# Patient Record
Sex: MALE | URBAN - METROPOLITAN AREA
[De-identification: names, ages, dates, MRNs, and addresses within clinical notes are randomized per-mention and may not be internally consistent; named-entity substitution may affect disease eponyms.]

---

## 2024-02-20 ENCOUNTER — TELEPHONE (OUTPATIENT)
Dept: UROLOGY | Age: 29
End: 2024-02-20

## 2024-05-21 ENCOUNTER — APPOINTMENT (OUTPATIENT)
Dept: UROLOGY | Age: 29
End: 2024-05-21

## 2024-06-13 ENCOUNTER — TELEPHONE (OUTPATIENT)
Dept: DERMATOLOGY | Age: 29
End: 2024-06-13

## 2024-08-21 ENCOUNTER — APPOINTMENT (OUTPATIENT)
Dept: DERMATOLOGY | Age: 29
End: 2024-08-21

## 2024-08-21 DIAGNOSIS — D48.9 NEOPLASM OF UNCERTAIN BEHAVIOR: Primary | ICD-10-CM

## 2024-08-21 DIAGNOSIS — D23.9 BENIGN NEOPLASM OF SKIN, UNSPECIFIED LOCATION: ICD-10-CM

## 2024-08-21 DIAGNOSIS — Z12.83 SCREENING EXAM FOR SKIN CANCER: ICD-10-CM

## 2024-08-26 LAB
ASR DISCLAIMER: NORMAL
CASE RPRT: NORMAL
CLINICAL INFO: NORMAL
PATH REPORT.FINAL DX SPEC: NORMAL
PATH REPORT.GROSS SPEC: NORMAL

## 2024-09-03 ENCOUNTER — TELEPHONE (OUTPATIENT)
Dept: DERMATOLOGY | Age: 29
End: 2024-09-03

## 2024-09-18 ENCOUNTER — OFFICE VISIT (OUTPATIENT)
Dept: FAMILY MEDICINE CLINIC | Facility: CLINIC | Age: 29
End: 2024-09-18
Payer: COMMERCIAL

## 2024-09-18 ENCOUNTER — HOSPITAL ENCOUNTER (OUTPATIENT)
Dept: GENERAL RADIOLOGY | Age: 29
Discharge: HOME | End: 2024-09-18
Attending: FAMILY MEDICINE
Payer: COMMERCIAL

## 2024-09-18 VITALS
WEIGHT: 189 LBS | HEIGHT: 71 IN | BODY MASS INDEX: 26.46 KG/M2 | DIASTOLIC BLOOD PRESSURE: 88 MMHG | HEART RATE: 78 BPM | TEMPERATURE: 98 F | SYSTOLIC BLOOD PRESSURE: 130 MMHG | OXYGEN SATURATION: 99 % | RESPIRATION RATE: 16 BRPM

## 2024-09-18 DIAGNOSIS — M94.0 COSTOCHONDRITIS: ICD-10-CM

## 2024-09-18 DIAGNOSIS — Z13.220 LIPID SCREENING: ICD-10-CM

## 2024-09-18 DIAGNOSIS — R07.89 CHEST TIGHTNESS: ICD-10-CM

## 2024-09-18 DIAGNOSIS — N46.9 MALE FERTILITY PROBLEM: ICD-10-CM

## 2024-09-18 DIAGNOSIS — Z00.00 WELLNESS EXAMINATION: Primary | ICD-10-CM

## 2024-09-18 DIAGNOSIS — R93.89 ABNORMAL CXR (CHEST X-RAY): ICD-10-CM

## 2024-09-18 DIAGNOSIS — Z13.0 SCREENING FOR DEFICIENCY ANEMIA: ICD-10-CM

## 2024-09-18 PROCEDURE — 71046 X-RAY EXAM CHEST 2 VIEWS: CPT | Performed by: FAMILY MEDICINE

## 2024-09-18 PROCEDURE — 99385 PREV VISIT NEW AGE 18-39: CPT | Performed by: FAMILY MEDICINE

## 2024-09-18 PROCEDURE — 99213 OFFICE O/P EST LOW 20 MIN: CPT | Performed by: FAMILY MEDICINE

## 2024-09-18 RX ORDER — IBUPROFEN 800 MG/1
800 TABLET, FILM COATED ORAL 3 TIMES DAILY
Qty: 270 TABLET | Refills: 3 | Status: SHIPPED | OUTPATIENT
Start: 2024-09-18 | End: 2025-09-13

## 2024-09-18 NOTE — PROGRESS NOTES
Marcos Albert is a 28 year old male.   Chief Complaint   Patient presents with    Chest Pain    Fatigue    Physical     Talk about having kids. Trying for 2 years.      HPI:1.  Cpx: new patient here in clinice with multiple issues:    Pt has hx of chest tighness that occurs out of nowhere and can last for days. Pt had it evaluated [place] in  and no cardica findings, pt states that it just started again yesterday now not very severe and can make him difficult to take a deep breath.     Pt states that is trying to become a father and has not occurred in 2 years, request sperm testing    History reviewed. No pertinent past medical history.    Past Surgical History:   Procedure Laterality Date    Tonsillectomy      [place] teeth removed Bilateral        History reviewed. No pertinent family history.    Social History:    Social History     Socioeconomic History    Marital status:    Tobacco Use    Smoking status: Never    Smokeless tobacco: Never   Vaping Use    Vaping status: Never Used   Substance and Sexual Activity    Alcohol use: Yes     Comment: minimal     Social Determinants of Health      Received from [facility]    Housing Stability       ALLERGIES:  Not on File   CURRENT MEDS:  Current Outpatient Medications   Medication Sig Dispense Refill    ibuprofen 800 MG Oral Tab Take 1 tablet (800 mg total) by mouth 3 (three) times daily. 270 tablet 3        REVIEW OF SYSTEMS:   GENERAL HEALTH: Feels well overall  SKIN: Denies any unusual skin lesions or rashes  EYES: No visual complaints or deficits  HEENT: Denies nasal congestion, sinus pain or sore throat; hearing loss negative  RESPIRATORY: Denies shortness of breath, wheezing or cough   CARDIOVASCULAR: Denies chest pain or FOLEY; no palpitations   GI: Denies nausea, vomiting, constipation, diarrhea; no rectal bleeding; no heartburn  GENITAL/: No urinary symptoms  MUSCULOSKELETAL: No joint complaints upper or lower  extremities  NEURO: No sensory or motor complaint  PSYCHE: No symptoms of depression or anxiety  HEMATOLOGY: No easy bleeding, bruising,   ENDOCRINE: No thyroid symptoms      PHYSICAL EXAM:   GENERAL: Well developed, well nourished, in no apparent distress, A&O X 3  SKIN: No rashes, no suspicious lesions  EYES: PERRL, EOMI, sclera anicteric, conjunctiva normal  HEENT: Normocephalic; normal nose, pharynx and TM's  NECK: supple; Full range of motion , no JVD, thyroid not enlarged, no carotid bruits  RESPIRATORY: Bilaterally  clear to auscultation, no rales, no wheezing, good A/E bilaterally, reproducble chest pain to pressure at LUSB  CARDIOVASCULAR: S1, S2 normal, RRR; no S3, no S4; no click; no murmur  ABDOMEN:  Soft, nondistended; no HSM; no masses; nontender, no guarding  GENITAL/: deferred  RECTAL:defered  LYMPHATIC: No lymphadenopathy  MUSCULOSKELETAL: Full painless ROM of U/E and L/E joints,no joint effusion  EXTREMITIES: No cyanosis, clubbing or edema, peripheral pulses intact  NEUROLOGIC:Motor power 5/5 all over, Cranial nerves 2-12: unremarcable; no sensory deficits  PSYCHIATRIC: Alert and oriented x 3; affect appropriate    ASSESSMENT/PLAN:     Diagnoses and all orders for this visit:    Wellness examination  -     Comp Metabolic Panel (14); Future  -     Lipid Panel; Future  -     CBC With Differential With Platelet; Future    Male fertility problem  -     TSH and Free T4; Future  -     Semen Analysis, Postvas [E]; Future  -     OFFICE/OUTPT VISIT,EST,LEVL III    Lipid screening  -     Lipid Panel; Future    Screening for deficiency anemia  -     CBC With Differential With Platelet; Future    Chest tightness  -     XR CHEST PA + LAT CHEST (CPT=71046); Future  -     OFFICE/OUTPT VISIT,EST,LEVL III    Costochondritis    Other orders  -     ibuprofen 800 MG Oral Tab; Take 1 tablet (800 mg total) by mouth 3 (three) times daily.    Discussed preventative care, fasting labs    Will need to see fertility but  will order spem testing to set him up with Leobardo Rodarte MD    Suspect costcondritis as cause given the reproducibility and how he describes it, needs nsaids and will check CXR to make sure there is no other anatomical issue. Not cardiac in nature, may need to deal with episodes as they occur         The patient verbalizes understanding of diagnosis, treatment plan and agrees to the plan of care.

## 2024-09-21 ENCOUNTER — HOSPITAL ENCOUNTER (OUTPATIENT)
Dept: CT IMAGING | Age: 29
Discharge: HOME | End: 2024-09-21
Attending: FAMILY MEDICINE
Payer: COMMERCIAL

## 2024-09-21 ENCOUNTER — IMAGING SERVICES (OUTPATIENT)
Dept: OTHER | Age: 29
End: 2024-09-21

## 2024-09-21 DIAGNOSIS — R93.89 ABNORMAL CXR (CHEST X-RAY): ICD-10-CM

## 2024-09-21 PROCEDURE — 71250 CT THORAX DX C-: CPT | Performed by: FAMILY MEDICINE

## 2024-09-23 ENCOUNTER — TELEPHONE (OUTPATIENT)
Dept: FAMILY MEDICINE CLINIC | Facility: CLINIC | Age: 29
End: 2024-09-23

## 2024-09-23 NOTE — TELEPHONE ENCOUNTER
Left message on voicemail/answering machine for patient to call office    Referred to Provider Information:  Provider Address Phone   Hubert Anderson MD [address] [facility] [id number] [phone]

## 2024-09-23 NOTE — TELEPHONE ENCOUNTER
----- Message from Brady Guillermo sent at 9/23/2024  8:03 AM CDT -----  Results reviewed. Please inform the patient there is a sizable cyst on you chest that will need to be evaluated, I will need to refer you to a cardiothoracic surgeon to help up in this process and any intervention would now be out of my hands, the referral will be placed on mychart.

## 2024-09-24 ENCOUNTER — TELEPHONE (OUTPATIENT)
Dept: FAMILY MEDICINE CLINIC | Facility: CLINIC | Age: 29
End: 2024-09-24

## 2024-09-24 ENCOUNTER — LABORATORY ENCOUNTER (OUTPATIENT)
Dept: LAB | Age: 29
End: 2024-09-24
Attending: FAMILY MEDICINE
Payer: COMMERCIAL

## 2024-09-24 DIAGNOSIS — Z13.0 SCREENING FOR DEFICIENCY ANEMIA: ICD-10-CM

## 2024-09-24 DIAGNOSIS — Z00.00 WELLNESS EXAMINATION: ICD-10-CM

## 2024-09-24 DIAGNOSIS — N46.9 MALE FERTILITY PROBLEM: ICD-10-CM

## 2024-09-24 DIAGNOSIS — Z13.220 LIPID SCREENING: ICD-10-CM

## 2024-09-24 LAB
ALBUMIN SERPL-MCNC: 4.4 G/DL (ref 3.2–4.8)
ALBUMIN/GLOB SERPL: 1.8 {RATIO} (ref 1–2)
ALP LIVER SERPL-CCNC: 96 U/L
ALT SERPL-CCNC: 20 U/L
ANION GAP SERPL CALC-SCNC: 6 MMOL/L (ref 0–18)
AST SERPL-CCNC: 18 U/L (ref ?–34)
BASOPHILS # BLD AUTO: 0.05 X10(3) UL (ref 0–0.2)
BASOPHILS NFR BLD AUTO: 0.6 %
BILIRUB SERPL-MCNC: 0.8 MG/DL (ref 0.3–1.2)
BUN BLD-MCNC: 11 MG/DL (ref 9–23)
CALCIUM BLD-MCNC: 9.9 MG/DL (ref 8.7–10.4)
CHLORIDE SERPL-SCNC: 104 MMOL/L (ref 98–112)
CHOLEST SERPL-MCNC: 222 MG/DL (ref ?–200)
CO2 SERPL-SCNC: 28 MMOL/L (ref 21–32)
CREAT BLD-MCNC: 1.22 MG/DL
EGFRCR SERPLBLD CKD-EPI 2021: 82 ML/MIN/1.73M2 (ref 60–?)
EOSINOPHIL # BLD AUTO: 0.15 X10(3) UL (ref 0–0.7)
EOSINOPHIL NFR BLD AUTO: 1.8 %
ERYTHROCYTE [DISTWIDTH] IN BLOOD BY AUTOMATED COUNT: 12.1 %
FASTING PATIENT LIPID ANSWER: YES
FASTING STATUS PATIENT QL REPORTED: YES
GLOBULIN PLAS-MCNC: 2.5 G/DL (ref 2–3.5)
GLUCOSE BLD-MCNC: 102 MG/DL (ref 70–99)
HCT VFR BLD AUTO: 44.9 %
HDLC SERPL-MCNC: 43 MG/DL (ref 40–59)
HGB BLD-MCNC: 15 G/DL
IMM GRANULOCYTES # BLD AUTO: 0.05 X10(3) UL (ref 0–1)
IMM GRANULOCYTES NFR BLD: 0.6 %
LDLC SERPL CALC-MCNC: 148 MG/DL (ref ?–100)
LYMPHOCYTES # BLD AUTO: 3.09 X10(3) UL (ref 1–4)
LYMPHOCYTES NFR BLD AUTO: 37.3 %
MCH RBC QN AUTO: 29.7 PG (ref 26–34)
MCHC RBC AUTO-ENTMCNC: 33.4 G/DL (ref 31–37)
MCV RBC AUTO: 88.9 FL
MONOCYTES # BLD AUTO: 0.82 X10(3) UL (ref 0.1–1)
MONOCYTES NFR BLD AUTO: 9.9 %
NEUTROPHILS # BLD AUTO: 4.12 X10 (3) UL (ref 1.5–7.7)
NEUTROPHILS # BLD AUTO: 4.12 X10(3) UL (ref 1.5–7.7)
NEUTROPHILS NFR BLD AUTO: 49.8 %
NONHDLC SERPL-MCNC: 179 MG/DL (ref ?–130)
OSMOLALITY SERPL CALC.SUM OF ELEC: 286 MOSM/KG (ref 275–295)
PLATELET # BLD AUTO: 248 10(3)UL (ref 150–450)
POTASSIUM SERPL-SCNC: 4.4 MMOL/L (ref 3.5–5.1)
PROT SERPL-MCNC: 6.9 G/DL (ref 5.7–8.2)
RBC # BLD AUTO: 5.05 X10(6)UL
SODIUM SERPL-SCNC: 138 MMOL/L (ref 136–145)
T4 FREE SERPL-MCNC: 1.3 NG/DL (ref 0.8–1.7)
TRIGL SERPL-MCNC: 169 MG/DL (ref 30–149)
TSI SER-ACNC: 1.92 MIU/ML (ref 0.55–4.78)
VLDLC SERPL CALC-MCNC: 32 MG/DL (ref 0–30)
WBC # BLD AUTO: 8.3 X10(3) UL (ref 4–11)

## 2024-09-24 PROCEDURE — 85025 COMPLETE CBC W/AUTO DIFF WBC: CPT

## 2024-09-24 PROCEDURE — 84443 ASSAY THYROID STIM HORMONE: CPT

## 2024-09-24 PROCEDURE — 36415 COLL VENOUS BLD VENIPUNCTURE: CPT

## 2024-09-24 PROCEDURE — 80061 LIPID PANEL: CPT

## 2024-09-24 PROCEDURE — 80053 COMPREHEN METABOLIC PANEL: CPT

## 2024-09-24 PROCEDURE — 84439 ASSAY OF FREE THYROXINE: CPT

## 2024-09-24 NOTE — TELEPHONE ENCOUNTER
Patient states he might have  orders starting November 1st.   Can not get into surgeon until 11/4.    Patient would like to discuss with Dr Dorsey   VV scheduled tomorrow

## 2024-09-24 NOTE — TELEPHONE ENCOUNTER
Patient scheduled with cardio thoracic surgeon November 4th (first available)    Patient scheduled to leave the country for a year on November 1st.    He can push this back to keep the appt    Patient has some questions about his condition and leaving the country, ect    Please adv  Thank you

## 2024-09-25 ENCOUNTER — TELEMEDICINE (OUTPATIENT)
Dept: FAMILY MEDICINE CLINIC | Facility: CLINIC | Age: 29
End: 2024-09-25
Payer: COMMERCIAL

## 2024-09-25 DIAGNOSIS — E78.00 PURE HYPERCHOLESTEROLEMIA: Primary | ICD-10-CM

## 2024-09-25 DIAGNOSIS — E32.8 THYMIC CYST (HCC): Primary | ICD-10-CM

## 2024-09-25 PROCEDURE — 99213 OFFICE O/P EST LOW 20 MIN: CPT | Performed by: FAMILY MEDICINE

## 2024-09-25 NOTE — PROGRESS NOTES
VIDEO VISIT    CHIEF COMPLAINT:  No chief complaint on file.      HISTORY OF PRESENT ILLNESS:  This is a 28 year old male who verbally consents to a video visit today,  for reviewe of labs and ct chest. 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 Wonders how we can help in connect w maria luisa sooner and discuss his labs .    ALLERGIES:  Not on File    CURRENT MEDICATIONS:  Current Outpatient Medications   Medication Sig Dispense Refill    ibuprofen 800 MG Oral Tab Take 1 tablet (800 mg total) by mouth 3 (three) times daily. 270 tablet 3       MEDICAL HISTORY:  No past medical history on file.  Past Surgical History:   Procedure Laterality Date    Tonsillectomy      [place] teeth removed Bilateral      No family history on file.  No family status information on file.     Social History     Socioeconomic History    Marital status:    Tobacco Use    Smoking status: Never    Smokeless tobacco: Never   Vaping Use    Vaping status: Never Used   Substance and Sexual Activity    Alcohol use: Yes     Comment: minimal     Social Determinants of Health      Received from [facility]    Housing Stability       ROS:    GENERAL:  Denies fever or chills  RESPIRATORY:  Denies difficulty breathing  CARDIO:  Denies swelling of legs or chest pain with exertion  NEURO:  Denies recent falls or sudden changes of vision  PSYCH: Denies suicidal or homicidal ideations    VITALS:  No vitals were obtained by clinical staff during this visit.      PHYSICAL EXAM:    Physical exam is limited due to video visit.    Marcos Albert serves as a reliable historian.  Speech is clear and organized without difficulty speaking in full sentences.    No shortness of breath or cough noted during our conversation.  Overall is feeling well    ASSESSMENT &  PLAN:  Thymic cyst   Disucssed labs and ct findings. Labs wnl and ct does confrim thymic cyst, gave patient mutlple phone numbers and names of CT surgeron to see if he can be seen sooner. He is to call us back to formally place there referral with his choice. Also will contact records to get copy of cd with ct images

## 2024-10-01 ENCOUNTER — OFFICE VISIT (OUTPATIENT)
Dept: CARDIOLOGY | Age: 29
End: 2024-10-01

## 2024-10-01 PROCEDURE — 99202 OFFICE O/P NEW SF 15 MIN: CPT

## 2024-10-03 ENCOUNTER — OFFICE VISIT (OUTPATIENT)
Dept: HEMATOLOGY/ONCOLOGY | Facility: HOSPITAL | Age: 29
End: 2024-10-03
Attending: THORACIC SURGERY (CARDIOTHORACIC VASCULAR SURGERY)
Payer: COMMERCIAL

## 2024-10-03 VITALS
SYSTOLIC BLOOD PRESSURE: 132 MMHG | BODY MASS INDEX: 26.32 KG/M2 | HEIGHT: 71 IN | OXYGEN SATURATION: 100 % | HEART RATE: 64 BPM | WEIGHT: 188 LBS | TEMPERATURE: 98 F | DIASTOLIC BLOOD PRESSURE: 73 MMHG | RESPIRATION RATE: 16 BRPM

## 2024-10-03 DIAGNOSIS — J98.59 MEDIASTINAL MASS: Primary | ICD-10-CM

## 2024-10-03 NOTE — CONSULTS
Thoracic Surgery Consult Note     Name: Marcos Albert   Age: 28 year old   Sex: male.   MRN: [id number]    Reason for Consultation: anterior mediastinal mass    Consulting Physician: Dr. Willian Guillermo    Subjective:     Chief Complaint: \"They found something on my scan'     History of Present Illness:   Mr. Albert is a 28 year old male presenting with an anterior mediastinal mass.     This was initially found on CXR for chest tightness. CT chest from 9/21/24 showed a 4.4x4.1x5.1cm anterior mediastinal cystic lesion with a thick rind. Patient was referred by his PCP to discuss next steps.     Patient reports intermittent flare ups of costochondritis for the past four years. The pain is located primarily on his left side and is non-severe. He has not had any pain in the past three weeks. He denies any cough, shortness of breath, fevers, chills, weight loss, dysphagia, diplopia, or weakness.    PMH unremarkable. No history of heart disease. No blood thinners. No prior thoracic surgeries. Never smoker. No family history of cancer.     Review Of Systems:   10 point review of systems was conducted and was negative except for the pertinent positives listed in the above HPI.    Past Medical History: No past medical history on file.    Past Surgical History:   Past Surgical History:   Procedure Laterality Date    Tonsillectomy      [place] teeth removed Bilateral        Social History:   Social History     Socioeconomic History    Marital status:      Spouse name: Not on file    Number of children: Not on file    Years of education: Not on file    Highest education level: Not on file   Occupational History    Not on file   Tobacco Use    Smoking status: Never    Smokeless tobacco: Never   Vaping Use    Vaping status: Never Used   Substance and Sexual Activity    Alcohol use: Yes     Comment: minimal    Drug use: Not on file    Sexual activity: Not on file   Other Topics Concern    Not on file   Social History  Narrative    Not on file     Social Determinants of Health     Financial Resource Strain: Not on file   Food Insecurity: Not on file   Transportation Needs: Not on file   Physical Activity: Not on file   Stress: Not on file   Social Connections: Not on file   Housing Stability: Low Risk  (2/19/2024)    Received from [facility]    Housing Stability     Mortgage Payment Concerns?: Not on file     Number of Places Lived in the Last Year: Not on file     Unstable Housing?: Not on file       Family History: No family history on file.    Allergies:  Not on File    Medications:   Current Outpatient Medications on File Prior to Visit   Medication Sig Dispense Refill    ibuprofen 800 MG Oral Tab Take 1 tablet (800 mg total) by mouth 3 (three) times daily. 270 tablet 3     No current facility-administered medications on file prior to visit.     No current facility-administered medications on file as of 10/3/2024.         Objective:      Vital Signs:  /73 (BP Location: Right arm, Patient Position: Sitting, Cuff Size: adult)   Pulse 64   Temp 97.9 °F (36.6 °C) (Oral)   Resp 16   Ht 1.803 m (5' 11\")   Wt 85.3 kg (188 lb)   SpO2 100%   BMI 26.22 kg/m²     Physical Exam:  General: well appearing male in no acute distress  HEENT: Normocephalic, PERRL, EOMI, no scleral icterus  Neck: Supple, trachea midline, no JVD, no masses. Thyroid not grossly enlarged  Nodes: no cervical or supraclavicular lymphadenopathy appreciated  Heart: regular rate and rhythm. No murmurs, rubs or gallops. No lower extremity edema.  Lungs: Normal respiratory effort. Clear to ascultation bilaterally.   Abdomen: Soft, Non-tender, non-distended. No hepatosplenomegaly noted.  Extremities: No clubbing or cyanosis. No lateralizing weakness  Neuro: No gross cranial nerve defects, no loss of sensation  Psych:  oriented to person place and time, normal mood and affect      Labs:   Lab Results   Component Value Date/Time    WBC 8.3  09/24/2024 09:01 AM    HGB 15.0 09/24/2024 09:01 AM    HCT 44.9 09/24/2024 09:01 AM    .0 09/24/2024 09:01 AM    MCV 88.9 09/24/2024 09:01 AM     Lab Results   Component Value Date/Time     09/24/2024 09:01 AM    K 4.4 09/24/2024 09:01 AM     09/24/2024 09:01 AM    CO2 28.0 09/24/2024 09:01 AM    BUN 11 09/24/2024 09:01 AM     (H) 09/24/2024 09:01 AM    CA 9.9 09/24/2024 09:01 AM     No results found for: \"INR\", \"PT\", \"PTT\"  No components found for: \"TROPI\"  Lab Results   Component Value Date/Time    ALB 4.4 09/24/2024 09:01 AM    TP 6.9 09/24/2024 09:01 AM    ALT 20 09/24/2024 09:01 AM    AST 18 09/24/2024 09:01 AM         Review of Data:   CT chest 9/24/24    FINDINGS:    LUNGS:  No visible pulmonary disease.    VASCULATURE:  Pulmonary vessels are unremarkable within the limits of a noncontrast CT.    OLGA:  No mass or adenopathy.    MEDIASTINUM:  Anterior AP window mediastinal 4.4 x 4.1 x 5.1 cm cystic lesion is noted.  Adjacent residual thymic tissue is noted.  Thick rind is noted.  CARDIAC:  No enlargement, pericardial thickening, or significant coronary artery calcification.  PLEURA:  No mass or effusion.    THORACIC AORTA:  Unremarkable as seen on non-contrast imaging.  CHEST WALL:  No mass or axillary adenopathy.    LIMITED ABDOMEN:  Limited images of the upper abdomen are unremarkable.    BONES:  No bony lesion or fracture.                     Impression   CONCLUSION:       1. No suspicious nodule, mass or consolidation lung parenchyma and self.     2. There is a large cystic mass with a thick rind in the anterior mediastinum adjacent to the residual thymic tissue.  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  A thymic cyst is of consideration.  A developing cystic thymoma is also of  consideration.  An MRI or CT of this region with contrast may be done for further evaluation.       Assessment/Plan:     Mr. Albert is a 28 year old male  presenting with an anterior mediastinal  mass.     This was initially found on CXR for chest tightness. CT chest from 9/21/24 showed a 4.4x4.1x5.1cm anterior mediastinal cystic lesion with a thick rind. Patient was referred by his PCP to discuss next steps.     Discussed options including continued surveillance (repeat CT chest in 3 months), surgical resection, or CT guided biopsy. Imaging findings are not totally consistent with a benign cyst. Discussed the risks and benefits of surgery. Patient expressed understanding and would like to proceed with surgery.     -Scheduled for robotic assisted anterior mediastinal mass resection on 11/15/24 at [place] with Dr. Dominguez  -Pre op labs ordered    Ene Collins PA-C  Thoracic Surgery    I have seen and examined that patient and agree with the above assessment and plan.  I have personally reviewed all the pertinent imaging, discussed the case in thoracic oncology tumor board and with the referring physician.     Mr. Albert  is a 28 year old year old male who had anterior mediastinal mass found on a CT scan done for chest tightness.  This 5cm mass is largely cystic but it is surrounded by thick, irregular tissue .  These findings are concerning for a possible cystic thymoma.  Other possiblities include: germ cell tumor or ectopic thyroid or benign entities such as thymic or pericardial cyst. I went over several options with Mr. Albert.  These include surveillance, MRI or needle biopsy. Surgery would be a robotic thymectomy.  Risks include: infection, bleeding, sternotomy, death, phrenic nerve injury, failure to relieve symptoms and benign pathology.  Mr. Albert understands the risks and wishes to proceed. We will plan on surgery November 15th at [place].    Cliff Dominguez MD  Thoracic Surgery  Pager [phone]    I spent 80 minutes on this visit which included: review of tests, independently interpreting results, obtaining history, counseling on additional tests and procedures, communicating with the  consulting physician,  care coordination and surgery, its risks and alternatives as described in the above assessment and plan.

## 2024-11-06 ENCOUNTER — NURSE ONLY (OUTPATIENT)
Dept: FAMILY MEDICINE CLINIC | Facility: CLINIC | Age: 29
End: 2024-11-06
Payer: COMMERCIAL

## 2024-11-06 ENCOUNTER — LABORATORY ENCOUNTER (OUTPATIENT)
Dept: LAB | Age: 29
End: 2024-11-06
Attending: THORACIC SURGERY (CARDIOTHORACIC VASCULAR SURGERY)
Payer: COMMERCIAL

## 2024-11-06 DIAGNOSIS — Z01.818 PRE-OP TESTING: ICD-10-CM

## 2024-11-06 DIAGNOSIS — J98.59 MEDIASTINAL MASS: ICD-10-CM

## 2024-11-06 DIAGNOSIS — J98.59 MEDIASTINAL MASS: Primary | ICD-10-CM

## 2024-11-06 LAB
ANION GAP SERPL CALC-SCNC: 0 MMOL/L (ref 0–18)
ATRIAL RATE: 60 BPM
BUN BLD-MCNC: 12 MG/DL (ref 9–23)
CALCIUM BLD-MCNC: 10 MG/DL (ref 8.7–10.4)
CHLORIDE SERPL-SCNC: 107 MMOL/L (ref 98–112)
CO2 SERPL-SCNC: 31 MMOL/L (ref 21–32)
CREAT BLD-MCNC: 1.08 MG/DL
EGFRCR SERPLBLD CKD-EPI 2021: 95 ML/MIN/1.73M2 (ref 60–?)
ERYTHROCYTE [DISTWIDTH] IN BLOOD BY AUTOMATED COUNT: 11.9 %
FASTING STATUS PATIENT QL REPORTED: YES
GLUCOSE BLD-MCNC: 85 MG/DL (ref 70–99)
HCT VFR BLD AUTO: 43.2 %
HGB BLD-MCNC: 14.8 G/DL
MCH RBC QN AUTO: 29.7 PG (ref 26–34)
MCHC RBC AUTO-ENTMCNC: 34.3 G/DL (ref 31–37)
MCV RBC AUTO: 86.6 FL
OSMOLALITY SERPL CALC.SUM OF ELEC: 285 MOSM/KG (ref 275–295)
P AXIS: 37 DEGREES
P-R INTERVAL: 152 MS
PLATELET # BLD AUTO: 223 10(3)UL (ref 150–450)
POTASSIUM SERPL-SCNC: 4 MMOL/L (ref 3.5–5.1)
Q-T INTERVAL: 400 MS
QRS DURATION: 106 MS
QTC CALCULATION (BEZET): 400 MS
R AXIS: 87 DEGREES
RBC # BLD AUTO: 4.99 X10(6)UL
RH BLOOD TYPE: POSITIVE
SODIUM SERPL-SCNC: 138 MMOL/L (ref 136–145)
T AXIS: 21 DEGREES
VENTRICULAR RATE: 60 BPM
WBC # BLD AUTO: 7.5 X10(3) UL (ref 4–11)

## 2024-11-06 PROCEDURE — 85027 COMPLETE CBC AUTOMATED: CPT

## 2024-11-06 PROCEDURE — 80048 BASIC METABOLIC PNL TOTAL CA: CPT

## 2024-11-06 PROCEDURE — 86901 BLOOD TYPING SEROLOGIC RH(D): CPT

## 2024-11-06 PROCEDURE — 86900 BLOOD TYPING SEROLOGIC ABO: CPT

## 2024-11-06 PROCEDURE — 93000 ELECTROCARDIOGRAM COMPLETE: CPT

## 2024-11-06 PROCEDURE — 86850 RBC ANTIBODY SCREEN: CPT

## 2024-11-06 PROCEDURE — 36415 COLL VENOUS BLD VENIPUNCTURE: CPT

## 2024-11-06 PROCEDURE — 87641 MR-STAPH DNA AMP PROBE: CPT

## 2024-11-06 NOTE — PROGRESS NOTES
Marcos Albert present in office for nurse visit.  EKG completed  Ordered by Dr. Dominguez     Preliminary EKG results routed to Dr. Lombardo via Epic    All questions/concerns addressed. Patient left in stable condition.

## 2024-11-07 LAB
ANTIBODY SCREEN: NEGATIVE
MRSA DNA SPEC QL NAA+PROBE: NEGATIVE
RH BLOOD TYPE: POSITIVE

## 2024-11-13 ENCOUNTER — TELEPHONE (OUTPATIENT)
Dept: FAMILY MEDICINE CLINIC | Facility: CLINIC | Age: 29
End: 2024-11-13

## 2024-11-13 NOTE — TELEPHONE ENCOUNTER
EKG ordered by Dr. Lombardo    11/06/24 EKG routed to Dr. Dominguez via Epic - please review and sign. Thank you   What Type Of Note Output Would You Prefer (Optional)?: Bullet Format How Severe Is Your Acne?: mild Is This A New Presentation, Or A Follow-Up?: Acne

## 2024-11-13 NOTE — TELEPHONE ENCOUNTER
Abida with [facility] requesting provider sign EKG, copy faxed is \"unconfirmed\". Please re-fax to [phone].

## 2024-11-13 NOTE — PAT NURSING NOTE
Received unconfirmed EKG tracing from PCP ; spoke with office and requested to fax a confirmed copy that it was reviewed and signed by PCP . PAT office fax number provided .

## 2024-11-13 NOTE — TELEPHONE ENCOUNTER
Leila with Dr. Dominguez's office stated they need a printed EKG tracing.  Can you please fax to:    FAX:  [phone]    Thank you!

## 2024-11-14 ENCOUNTER — TELEPHONE (OUTPATIENT)
Dept: FAMILY MEDICINE CLINIC | Facility: CLINIC | Age: 29
End: 2024-11-14

## 2024-11-14 NOTE — TELEPHONE ENCOUNTER
NURSE FROM PRE-ADMISSION CALLING. NEEDS EKG REVIEWED AND SIGNED BY PROVIDER. PATIENT IS SCHEDULED FOR SURGERY TOMORROW W/ DR. MRATINEZ.     PLEASE FAX [phone]

## 2024-11-14 NOTE — DISCHARGE INSTRUCTIONS
Thoracic Surgery Post-Operative Appointment     Follow up appointment scheduled: with Dr. Dominguez on November 21st at 1:30pm located at the [facility].  Thank you.      Thoracic Surgery Discharge Instructions     Pain Management  You will be sent home with a prescription for pain medicine.  This will likely be a narcotic pain medicine such as Oxycodone or Norco (hydrocodone/acetaminophen).  If no contraindications, start with extra strength acetaminophen (Tylenol) or ibuprofen (Advil/Motrin) scheduled, and use narcotics for breakthrough pain. Ice packs can be helpful as well for surface level, skin incision pain.      - Take extra strength acetaminophen (Tylenol) 500 mg every 4 to 6 hours, as long as you have no known liver conditions.   - **Max dose for acetaminophen (Tylenol) is 4000 mg per day. If taking Norco, please note that each tab contains 325 mg of acetaminophen (Tylenol).  - Unless you have kidney problems, ibuprofen 600 mg every 6 hours can be used along with Tylenol for additional pain control.    Restrictions  Upon discharge home, do not get in an airplane or soak in a tub/pool until after your first follow up to see me in the office. You may shower, washing incisions gently with soap and water.    Other than that, no activity restrictions. You should attempt to be as active as possible. What ever you feel up to doing, you can do. Walking is strongly encouraged. You may drive (not within 4 hours of taking prescription pain medications).     No dietary restrictions. If taking prescription pain medications you may become constipated. Fluids, fiber and over the counter stool softeners are helpful for this.    Exercises  Do range of motion exercises twice a day with the arm on the side of your operation. The easiest is to \"walk\" your hand up the wall with your fingers. Eventually you should be able to get your arm straight up over your head.    You will be sent home with your breathing  \"toys\" -- like your incentive spirometer. Use this frequently at home. 10 times per hour while awake, if possible. If watching TV, use it at every commercial break.    Follow-up Appointment  Our office will reach out to you regarding a follow up appointment, if not already scheduled. Appointment will be approximately 1-2 weeks after discharge.     If you are experiencing any of these symptoms, please call our office at [phone]. Office hours are Monday through Friday, 8 am to 4:30 pm.  If you are calling the office after hours, please call the Thoracic Surgery On-Call number [phone], and state that you are a patient from [place] or [facility].      - Persistent fevers of 101.5 or greater  - Worsening pain  - Worsening shortness of breath  - Signs of infection in your wound such as drainage, worsening of redness, swelling or     pain.  - Anything else that concerns you.

## 2024-11-15 ENCOUNTER — ANESTHESIA (OUTPATIENT)
Dept: SURGERY | Facility: HOSPITAL | Age: 29
End: 2024-11-15
Payer: COMMERCIAL

## 2024-11-15 ENCOUNTER — HOSPITAL ENCOUNTER (INPATIENT)
Facility: HOSPITAL | Age: 29
LOS: 2 days | Discharge: HOME | DRG: 983 | End: 2024-11-17
Attending: THORACIC SURGERY (CARDIOTHORACIC VASCULAR SURGERY) | Admitting: THORACIC SURGERY (CARDIOTHORACIC VASCULAR SURGERY)
Payer: COMMERCIAL

## 2024-11-15 ENCOUNTER — HOSPITAL ENCOUNTER (INPATIENT)
Facility: HOSPITAL | Age: 29
LOS: 2 days | Discharge: HOME | End: 2024-11-17
Attending: THORACIC SURGERY (CARDIOTHORACIC VASCULAR SURGERY) | Admitting: THORACIC SURGERY (CARDIOTHORACIC VASCULAR SURGERY)
Payer: COMMERCIAL

## 2024-11-15 ENCOUNTER — ANESTHESIA EVENT (OUTPATIENT)
Dept: SURGERY | Facility: HOSPITAL | Age: 29
End: 2024-11-15
Payer: COMMERCIAL

## 2024-11-15 DIAGNOSIS — Z01.818 PRE-OP TESTING: ICD-10-CM

## 2024-11-15 DIAGNOSIS — J98.59 MEDIASTINAL MASS: Primary | ICD-10-CM

## 2024-11-15 PROCEDURE — 99222 1ST HOSP IP/OBS MODERATE 55: CPT | Performed by: HOSPITALIST

## 2024-11-15 PROCEDURE — 31622 DX BRONCHOSCOPE/WASH: CPT | Performed by: STUDENT IN AN ORGANIZED HEALTH CARE EDUCATION/TRAINING PROGRAM

## 2024-11-15 PROCEDURE — 0BNL4ZZ RELEASE LEFT LUNG, PERCUTANEOUS ENDOSCOPIC APPROACH: ICD-10-PCS | Performed by: THORACIC SURGERY (CARDIOTHORACIC VASCULAR SURGERY)

## 2024-11-15 PROCEDURE — 0WBC4ZZ EXCISION OF MEDIASTINUM, PERCUTANEOUS ENDOSCOPIC APPROACH: ICD-10-PCS | Performed by: THORACIC SURGERY (CARDIOTHORACIC VASCULAR SURGERY)

## 2024-11-15 PROCEDURE — 32662 THORACOSCOPY W/MEDIAST EXC: CPT | Performed by: STUDENT IN AN ORGANIZED HEALTH CARE EDUCATION/TRAINING PROGRAM

## 2024-11-15 PROCEDURE — 3E0T3BZ INTRODUCTION OF ANESTHETIC AGENT INTO PERIPHERAL NERVES AND PLEXI, PERCUTANEOUS APPROACH: ICD-10-PCS | Performed by: THORACIC SURGERY (CARDIOTHORACIC VASCULAR SURGERY)

## 2024-11-15 PROCEDURE — 0BJ08ZZ INSPECTION OF TRACHEOBRONCHIAL TREE, VIA NATURAL OR ARTIFICIAL OPENING ENDOSCOPIC: ICD-10-PCS | Performed by: THORACIC SURGERY (CARDIOTHORACIC VASCULAR SURGERY)

## 2024-11-15 PROCEDURE — 8E0W4CZ ROBOTIC ASSISTED PROCEDURE OF TRUNK REGION, PERCUTANEOUS ENDOSCOPIC APPROACH: ICD-10-PCS | Performed by: THORACIC SURGERY (CARDIOTHORACIC VASCULAR SURGERY)

## 2024-11-15 RX ORDER — HYDROMORPHONE HYDROCHLORIDE 1 MG/ML
0.2 INJECTION, SOLUTION INTRAMUSCULAR; INTRAVENOUS; SUBCUTANEOUS EVERY 5 MIN PRN
Status: DISCONTINUED | OUTPATIENT
Start: 2024-11-15 | End: 2024-11-15 | Stop reason: HOSPADM

## 2024-11-15 RX ORDER — DEXAMETHASONE SODIUM PHOSPHATE 4 MG/ML
VIAL (ML) INJECTION AS NEEDED
Status: DISCONTINUED | OUTPATIENT
Start: 2024-11-15 | End: 2024-11-15 | Stop reason: SURG

## 2024-11-15 RX ORDER — SODIUM CHLORIDE 9 MG/ML
INJECTION, SOLUTION INTRAVENOUS CONTINUOUS
Status: DISCONTINUED | OUTPATIENT
Start: 2024-11-15 | End: 2024-11-16

## 2024-11-15 RX ORDER — HYDROMORPHONE HYDROCHLORIDE 1 MG/ML
0.5 INJECTION, SOLUTION INTRAMUSCULAR; INTRAVENOUS; SUBCUTANEOUS EVERY 2 HOUR PRN
Status: DISCONTINUED | OUTPATIENT
Start: 2024-11-15 | End: 2024-11-17

## 2024-11-15 RX ORDER — GLYCOPYRROLATE 0.2 MG/ML
INJECTION, SOLUTION INTRAMUSCULAR; INTRAVENOUS AS NEEDED
Status: DISCONTINUED | OUTPATIENT
Start: 2024-11-15 | End: 2024-11-15 | Stop reason: SURG

## 2024-11-15 RX ORDER — ROCURONIUM BROMIDE 10 MG/ML
INJECTION, SOLUTION INTRAVENOUS AS NEEDED
Status: DISCONTINUED | OUTPATIENT
Start: 2024-11-15 | End: 2024-11-15 | Stop reason: SURG

## 2024-11-15 RX ORDER — HYDROMORPHONE HYDROCHLORIDE 1 MG/ML
0.6 INJECTION, SOLUTION INTRAMUSCULAR; INTRAVENOUS; SUBCUTANEOUS EVERY 5 MIN PRN
Status: DISCONTINUED | OUTPATIENT
Start: 2024-11-15 | End: 2024-11-15 | Stop reason: HOSPADM

## 2024-11-15 RX ORDER — MIDAZOLAM HYDROCHLORIDE 1 MG/ML
INJECTION INTRAMUSCULAR; INTRAVENOUS AS NEEDED
Status: DISCONTINUED | OUTPATIENT
Start: 2024-11-15 | End: 2024-11-15 | Stop reason: SURG

## 2024-11-15 RX ORDER — SODIUM PHOSPHATE, DIBASIC AND SODIUM PHOSPHATE, MONOBASIC 7; 19 G/230ML; G/230ML
1 ENEMA RECTAL ONCE AS NEEDED
Status: DISCONTINUED | OUTPATIENT
Start: 2024-11-15 | End: 2024-11-17

## 2024-11-15 RX ORDER — PROCHLORPERAZINE EDISYLATE 5 MG/ML
5 INJECTION INTRAMUSCULAR; INTRAVENOUS EVERY 8 HOURS PRN
Status: DISCONTINUED | OUTPATIENT
Start: 2024-11-15 | End: 2024-11-15

## 2024-11-15 RX ORDER — ONDANSETRON 2 MG/ML
4 INJECTION INTRAMUSCULAR; INTRAVENOUS EVERY 6 HOURS PRN
Status: DISCONTINUED | OUTPATIENT
Start: 2024-11-15 | End: 2024-11-15 | Stop reason: HOSPADM

## 2024-11-15 RX ORDER — MORPHINE SULFATE 10 MG/ML
6 INJECTION, SOLUTION INTRAMUSCULAR; INTRAVENOUS EVERY 10 MIN PRN
Status: DISCONTINUED | OUTPATIENT
Start: 2024-11-15 | End: 2024-11-15 | Stop reason: HOSPADM

## 2024-11-15 RX ORDER — HYDROMORPHONE HYDROCHLORIDE 1 MG/ML
0.4 INJECTION, SOLUTION INTRAMUSCULAR; INTRAVENOUS; SUBCUTANEOUS EVERY 2 HOUR PRN
Status: DISCONTINUED | OUTPATIENT
Start: 2024-11-15 | End: 2024-11-15

## 2024-11-15 RX ORDER — ONDANSETRON 2 MG/ML
INJECTION INTRAMUSCULAR; INTRAVENOUS AS NEEDED
Status: DISCONTINUED | OUTPATIENT
Start: 2024-11-15 | End: 2024-11-15 | Stop reason: SURG

## 2024-11-15 RX ORDER — HEPARIN SODIUM 5000 [USP'U]/ML
5000 INJECTION, SOLUTION INTRAVENOUS; SUBCUTANEOUS EVERY 12 HOURS SCHEDULED
Status: DISCONTINUED | OUTPATIENT
Start: 2024-11-15 | End: 2024-11-17

## 2024-11-15 RX ORDER — POLYETHYLENE GLYCOL 3350 17 G/17G
17 POWDER, FOR SOLUTION ORAL DAILY PRN
Status: DISCONTINUED | OUTPATIENT
Start: 2024-11-15 | End: 2024-11-17

## 2024-11-15 RX ORDER — BISACODYL 10 MG
10 SUPPOSITORY, RECTAL RECTAL
Status: DISCONTINUED | OUTPATIENT
Start: 2024-11-15 | End: 2024-11-17

## 2024-11-15 RX ORDER — OXYCODONE HYDROCHLORIDE 5 MG/1
10 TABLET ORAL EVERY 4 HOURS PRN
Status: DISCONTINUED | OUTPATIENT
Start: 2024-11-15 | End: 2024-11-17

## 2024-11-15 RX ORDER — MORPHINE SULFATE 4 MG/ML
2 INJECTION, SOLUTION INTRAMUSCULAR; INTRAVENOUS EVERY 10 MIN PRN
Status: DISCONTINUED | OUTPATIENT
Start: 2024-11-15 | End: 2024-11-15 | Stop reason: HOSPADM

## 2024-11-15 RX ORDER — OXYCODONE HYDROCHLORIDE 5 MG/1
5 TABLET ORAL EVERY 4 HOURS PRN
Status: DISCONTINUED | OUTPATIENT
Start: 2024-11-15 | End: 2024-11-17

## 2024-11-15 RX ORDER — SODIUM CHLORIDE, SODIUM LACTATE, POTASSIUM CHLORIDE, CALCIUM CHLORIDE 600; 310; 30; 20 MG/100ML; MG/100ML; MG/100ML; MG/100ML
INJECTION, SOLUTION INTRAVENOUS CONTINUOUS
Status: DISCONTINUED | OUTPATIENT
Start: 2024-11-15 | End: 2024-11-15 | Stop reason: HOSPADM

## 2024-11-15 RX ORDER — METOCLOPRAMIDE HYDROCHLORIDE 5 MG/ML
10 INJECTION INTRAMUSCULAR; INTRAVENOUS EVERY 6 HOURS PRN
Status: DISCONTINUED | OUTPATIENT
Start: 2024-11-15 | End: 2024-11-17

## 2024-11-15 RX ORDER — ACETAMINOPHEN 10 MG/ML
1000 INJECTION, SOLUTION INTRAVENOUS EVERY 6 HOURS
Status: DISCONTINUED | OUTPATIENT
Start: 2024-11-15 | End: 2024-11-17

## 2024-11-15 RX ORDER — ACETAMINOPHEN 500 MG
1000 TABLET ORAL ONCE
Status: COMPLETED | OUTPATIENT
Start: 2024-11-15 | End: 2024-11-15

## 2024-11-15 RX ORDER — PROCHLORPERAZINE EDISYLATE 5 MG/ML
5 INJECTION INTRAMUSCULAR; INTRAVENOUS EVERY 8 HOURS PRN
Status: DISCONTINUED | OUTPATIENT
Start: 2024-11-15 | End: 2024-11-15 | Stop reason: HOSPADM

## 2024-11-15 RX ORDER — CALCIUM CARBONATE 500 MG/1
1000 TABLET, CHEWABLE ORAL 3 TIMES DAILY PRN
Status: DISCONTINUED | OUTPATIENT
Start: 2024-11-15 | End: 2024-11-17

## 2024-11-15 RX ORDER — BUPIVACAINE HYDROCHLORIDE 2.5 MG/ML
INJECTION, SOLUTION EPIDURAL; INFILTRATION; INTRACAUDAL AS NEEDED
Status: DISCONTINUED | OUTPATIENT
Start: 2024-11-15 | End: 2024-11-15 | Stop reason: HOSPADM

## 2024-11-15 RX ORDER — SODIUM CHLORIDE, SODIUM LACTATE, POTASSIUM CHLORIDE, CALCIUM CHLORIDE 600; 310; 30; 20 MG/100ML; MG/100ML; MG/100ML; MG/100ML
INJECTION, SOLUTION INTRAVENOUS CONTINUOUS
Status: DISCONTINUED | OUTPATIENT
Start: 2024-11-15 | End: 2024-11-15 | Stop reason: ALTCHOICE

## 2024-11-15 RX ORDER — HYDROMORPHONE HYDROCHLORIDE 1 MG/ML
0.8 INJECTION, SOLUTION INTRAMUSCULAR; INTRAVENOUS; SUBCUTANEOUS EVERY 2 HOUR PRN
Status: DISCONTINUED | OUTPATIENT
Start: 2024-11-15 | End: 2024-11-15

## 2024-11-15 RX ORDER — SENNOSIDES 8.6 MG
17.2 TABLET ORAL NIGHTLY PRN
Status: DISCONTINUED | OUTPATIENT
Start: 2024-11-15 | End: 2024-11-17

## 2024-11-15 RX ORDER — HYDROMORPHONE HYDROCHLORIDE 1 MG/ML
2 INJECTION, SOLUTION INTRAMUSCULAR; INTRAVENOUS; SUBCUTANEOUS EVERY 2 HOUR PRN
Status: DISCONTINUED | OUTPATIENT
Start: 2024-11-15 | End: 2024-11-17

## 2024-11-15 RX ORDER — NALOXONE HYDROCHLORIDE 0.4 MG/ML
0.08 INJECTION, SOLUTION INTRAMUSCULAR; INTRAVENOUS; SUBCUTANEOUS AS NEEDED
Status: DISCONTINUED | OUTPATIENT
Start: 2024-11-15 | End: 2024-11-15 | Stop reason: HOSPADM

## 2024-11-15 RX ORDER — ONDANSETRON 2 MG/ML
4 INJECTION INTRAMUSCULAR; INTRAVENOUS EVERY 6 HOURS PRN
Status: DISCONTINUED | OUTPATIENT
Start: 2024-11-15 | End: 2024-11-17

## 2024-11-15 RX ORDER — MORPHINE SULFATE 4 MG/ML
4 INJECTION, SOLUTION INTRAMUSCULAR; INTRAVENOUS EVERY 10 MIN PRN
Status: DISCONTINUED | OUTPATIENT
Start: 2024-11-15 | End: 2024-11-15 | Stop reason: HOSPADM

## 2024-11-15 RX ORDER — EPHEDRINE SULFATE 50 MG/ML
INJECTION, SOLUTION INTRAVENOUS AS NEEDED
Status: DISCONTINUED | OUTPATIENT
Start: 2024-11-15 | End: 2024-11-15 | Stop reason: SURG

## 2024-11-15 RX ORDER — HYDROMORPHONE HYDROCHLORIDE 1 MG/ML
0.4 INJECTION, SOLUTION INTRAMUSCULAR; INTRAVENOUS; SUBCUTANEOUS EVERY 5 MIN PRN
Status: DISCONTINUED | OUTPATIENT
Start: 2024-11-15 | End: 2024-11-15 | Stop reason: HOSPADM

## 2024-11-15 RX ORDER — HYDROMORPHONE HYDROCHLORIDE 1 MG/ML
1 INJECTION, SOLUTION INTRAMUSCULAR; INTRAVENOUS; SUBCUTANEOUS EVERY 2 HOUR PRN
Status: DISCONTINUED | OUTPATIENT
Start: 2024-11-15 | End: 2024-11-17

## 2024-11-15 RX ADMIN — MIDAZOLAM HYDROCHLORIDE 2 MG: 1 INJECTION INTRAMUSCULAR; INTRAVENOUS at 07:36:00

## 2024-11-15 RX ADMIN — GLYCOPYRROLATE 0.4 MG: 0.2 INJECTION, SOLUTION INTRAMUSCULAR; INTRAVENOUS at 09:04:00

## 2024-11-15 RX ADMIN — ROCURONIUM BROMIDE 50 MG: 10 INJECTION, SOLUTION INTRAVENOUS at 07:39:00

## 2024-11-15 RX ADMIN — EPHEDRINE SULFATE 10 MG: 50 INJECTION, SOLUTION INTRAVENOUS at 09:04:00

## 2024-11-15 RX ADMIN — DEXAMETHASONE SODIUM PHOSPHATE 8 MG: 4 MG/ML VIAL (ML) INJECTION at 07:39:00

## 2024-11-15 RX ADMIN — ONDANSETRON 4 MG: 2 INJECTION INTRAMUSCULAR; INTRAVENOUS at 07:39:00

## 2024-11-15 NOTE — CONSULTS
[facility]    PATIENT'S NAME: PELON NO   ATTENDING PHYSICIAN: Luzma Oscar MD   CONSULTING PHYSICIAN: Luzma Oscar MD   PATIENT ACCOUNT#:   [id number]    LOCATION:  [facility]  MEDICAL RECORD #:   [id number]       YOB: 1995  ADMISSION DATE:       11/15/2024      CONSULT DATE:  11/15/2024    REPORT OF CONSULTATION      REASON FOR ADMISSION:  Post mediastinoscopy and mass resection.    HISTORY OF PRESENT ILLNESS:  The patient is a 28-year-old  male who was recently evaluated for chest tightness.  Chest x-ray was abnormal, showing mediastinal abnormality.  CT scan of the chest showed anterior mediastinal left-sided cystic mass, could be pericardial cyst, rule out thymoma.  The mass size was 4.4 cm.  The patient was referred to Thoracic Surgery and scheduled today for above-mentioned procedure by Dr. Dominguez.  Postoperatively transferred to PACU for further monitoring.    PAST MEDICAL HISTORY:  None.    PAST SURGICAL HISTORY:  Tonsillectomy.    ALLERGIES:  No known drug allergies.     SOCIAL HISTORY:  No tobacco or drug use.  Social alcohol.  Lives with his family.  Independent in his basic activities of daily living.     FAMILY HISTORY:  Negative for medical problems.    REVIEW OF SYSTEMS:  Currently resting in bed.  Mid sternal and left lateral chest discomfort.  No shortness of breath.  Other 12-point review of systems is negative.        PHYSICAL EXAMINATION:    GENERAL:  Alert and oriented to time, place, and person.  No acute distress.    VITAL SIGNS:  Temperature 97.7, pulse 76, respiratory rate 18, blood pressure 125/67, pulse ox 96% on room air.  HEENT:  Atraumatic.  Oropharynx clear.  Moist mucous membranes.  Ears and nose normal.  Eyes:  Anicteric sclerae.  NECK:  Supple.  No lymphadenopathy.  Trachea midline.  Full range of motion.  LUNGS:  Diminished breathing sounds, left lung base.  Otherwise clear to auscultation.  CHEST:  Left lateral chest tube  projecting and connecting to an atrium.  HEART:  Regular rate and rhythm.  S1 and S2 auscultated.  No murmur.  ABDOMEN:  Soft, nondistended.  No tenderness.  Positive bowel sounds.  EXTREMITIES:  No peripheral edema, clubbing, or cyanosis.  NEUROLOGIC:  Motor and sensory intact.      ASSESSMENT AND PLAN:  Mediastinal cystic mass, status post flexible bronchoscopy, robotic-assisted thoracoscopic surgery, anterior mediastinal mass resection.  Pain control.  Monitor chest tube output.  Monitor hemodynamic status.  DVT prophylaxis.  Full pathology report still pending.     Dictated By Luzma Oscar MD  d: 11/15/2024 12:22:43  t: 11/15/2024 12:37:30  Job [id number]  FB/

## 2024-11-15 NOTE — OPERATIVE REPORT
[facility]    PATIENT'S NAME: PELON NO   ATTENDING PHYSICIAN: Luzma Oscar MD   OPERATING PHYSICIAN: Cliff Dominguez Jr., MD   PATIENT ACCOUNT#:   [id number]    LOCATION:  [facility]  MEDICAL RECORD #:   [id number]       YOB: 1995  ADMISSION DATE:       11/15/2024      OPERATION DATE:  11/15/2024    OPERATIVE REPORT      PREOPERATIVE DIAGNOSIS:  Anterior mediastinal mass.  POSTOPERATIVE DIAGNOSIS:  Anterior mediastinal mass.  PROCEDURE:    1.   Flexible bronchoscopy.  2.   Left robot-assisted resection of anterior mediastinal mass.  3.   Multilevel intercostal nerve block.    ASSISTANT:  Ene Collins PA-C     ANESTHESIA:  General dual-lumen endotracheal anesthesia.      ANESTHESIOLOGIST:  Golden Mak MD    SPECIMENS:  Anterior mediastinal mass for pathology and culture.    DRAINS:  24-Upper sorbian chest tube x1.      IMPLANTS:  None.    ESTIMATED BLOOD LOSS:  50 mL.    COMPLICATIONS:  None.    CONDITION:  Stable, to PACU.     INDICATIONS:  The patient is a 28-year-old man who had an 5 cm anterior mediastinal mass found on chest imaging done for chest tightness.  This was a cystic structure with thick lobulated walls.  It was difficult to determine if this was benign or malignant, but given its size and the thickness of the walls, I was concerned it could be and thus recommended surgical resection.  After going over the risks and benefits as noted in my preoperative note, the patient agreed.    FINDINGS:  A sizable cystic mass with thick walls was identified in anterior mediastinum as expected.  There was a lot of surrounding inflammation and necrosis.  The lesion was densely adhered to all the surrounding structures including the lung, the mediastinal pleura, and phrenic nerve.  This was carefully dissected free and removed.    OPERATIVE TECHNIQUE:  Patient was brought to the operating room, laid supine, and underwent general dual-lumen endotracheal anesthesia.  I performed  a flexible bronchoscopy.  The trachea, mainstem bronchi, lobar and segmental bronchi were examined bilaterally.  This was a normal bronchoscopy.  No endobronchial lesions were seen.  He was kept supine with a bump under his left side and his arms at his side.  He was prepped and draped in a sterile fashion.  Time-out was performed to confirm patient, side, and site of surgery.  I made an 8 mm incision in the anterior axillary line just lateral to the nipple and entered the chest bluntly.  I placed an 8 mm robotic trocar and applied CO2 insufflation to a pressure of 8 mmHg.  A camera through this trocar revealed I was safely in the chest space.  I then made 3 additional incisions for 2 additional robotic trocars and 1 assistant port which was a 12 mm AirSeal.  With these in place, I docked the Cinemagrami Xi robot.  The robot was targeted and instruments were placed under direct visualization.  I used a Cadiere as a grasper as well as a robotic vessel sealer as my primary energy device.  I could see right away that the area was very inflamed.  All the tissues were swollen and distorted.  It was clear that the lung was densely adhesed over the top of the mass.  I began by first dissecting on the inferior edge in the thymus.  I identified the phrenic nerve and stayed anterior to this.  I got down on to the pericardium and dissected over the top of the pericardium, going medial and cephalad.  This helped me dissect the mass off the pericardium.  As I went up medially, I again identified the phrenic nerve and stayed anterior to this.  Once this was done, I had to address the lung that was overlying it.  This was also densely adhesed with a lot of inflammation.  I was able to identify planes with careful dissection.  In some areas, small superficial lung injuries were encountered.  Eventually, I was able to get the entirety of the lung off the anterior aspect of this mass.  I continued the dissection.  As I approached the  hilum, this mass was closely entangled and densely adhesed to the phrenic nerve.  I used blunt dissection only when dissecting through this area.   I went through the mediastinal fat in order to stay out of the mass and away from the phrenic.  This was again all done bluntly so as to preserve the phrenic nerve.  Once this was then dissected bluntly away from the area of the phrenic nerve, the remainder of the mass was resected off the thymus superiorly.  Once the entirety of the mass was free, it was placed in an Erwin bag and removed from the chest.  I did a multilevel intercostal nerve block.  I used 60 mL of 0.25% Marcaine; 5 mL was injected into each interspace 2 through 9 under direct visualization with thoracoscope for postoperative pain control.  The remainder was used around the incisions.  I inspected for bleeding.  No significant sites of bleeding were seen.  There was some oozing from the inflammatory nature of the resection, but nothing that required intervention.  I placed a 24-Swedish chest tube posteriorly and secured it using 0 silk sutures.  Finally, I asked the anesthesiologist to reinflate the lung.  It reinflated well and filled the chest space.  I withdrew my camera and all instruments and closed the incisions in 3 layers with 2 layers of 2-0 Vicryl and 1 of 4-0 Monocryl.  I did cut a small hole in the mass in order to send some tissue for culture, as the inflammatory necrotic nature of this lesion raised the possibility of an infection or abscess as its pathology.    Dictated By Cliff Dominguez Jr., MD  d: 11/15/2024 10:11:04  t: 11/15/2024 13:26:14  [facility] [id number]  [facility]/

## 2024-11-15 NOTE — ANESTHESIA PROCEDURE NOTES
Airway  Date/Time: 11/15/2024 7:40 AM  Urgency: Elective      General Information and Staff    Patient location during procedure: OR  Anesthesiologist: Golden Mak MD  Performed: anesthesiologist   Performed by: Golden Mak MD  Authorized by: Golden Mak MD      Indications and Patient Condition  Indications for airway management: anesthesia  Sedation level: deep  Preoxygenated: yes  Patient position: sniffing  Mask difficulty assessment: 1 - vent by mask    Final Airway Details  Final airway type: endotracheal airway      Successful airway: ETT - double lumen left  Cuffed: yes   Successful intubation technique: direct laryngoscopy  Endotracheal tube insertion site: oral  Blade: Flora  Blade size: #4  ETT DL size (fr): 39    Cormack-Lehane Classification: grade IIB - view of arytenoids or posterior of glottis only  Placement verified by: bronchoscopy, capnometry and single lung ventilation   Measured from: teeth  Number of attempts at approach: 1  Number of other approaches attempted: 0

## 2024-11-15 NOTE — ANESTHESIA PROCEDURE NOTES
Arterial Line    Date/Time: 11/15/2024 7:47 AM    Performed by: Golden Mak MD  Authorized by: Golden Mak MD    General Information and Staff    Procedure Start:  11/15/2024 7:47 AM  Procedure End:  11/15/2024 7:51 AM  Anesthesiologist:  Golden Mak MD  Performed By:  Anesthesiologist  Patient Location:  OR  Indication: continuous blood pressure monitoring and blood sampling needed    Site Identification: surface landmarks    Preanesthetic Checklist: 2 patient identifiers, IV checked, risks and benefits discussed, monitors and equipment checked, pre-op evaluation, timeout performed, anesthesia consent and sterile technique used    Procedure Details    Catheter Size:  20 G  Catheter Length:  1 and 3/4 inch  Catheter Type:  Arrow  Seldinger Technique?: Yes    Laterality:  Right  Site:  Radial artery  Site Prep: chlorhexidine    Line Secured:  Wrist Brace, tape and Tegaderm    Assessment    Events: patient tolerated procedure well with no complications      Medications  11/15/2024 7:47 AM      Additional Comments

## 2024-11-15 NOTE — ANESTHESIA PREPROCEDURE EVALUATION
Anesthesia PreOp Note    HPI:     Marcos Albert is a 28 year old male who presents for preoperative consultation requested by: Angelica Mcghee, Cliff MIJARES MD    Date of Surgery: 11/15/2024    Procedure(s):  Flexible bronchoscopy, robotic assisted thoracoscopic surgery, anterior mediastinal mass resection  Indication: Mediastinal mass    Relevant Problems   No relevant active problems       NPO:  Last Liquid Consumption Date: 11/14/24  Last Liquid Consumption Time: 2100  Last Solid Consumption Date: 11/14/24  Last Solid Consumption Time: 2100  Last Liquid Consumption Date: 11/14/24          History Review:  There are no active problems to display for this patient.      History reviewed. No pertinent past medical history.    Past Surgical History:   Procedure Laterality Date    Tonsillectomy      [place] teeth removed Bilateral        Prescriptions Prior to Admission[1]  Current Medications and Prescriptions Ordered in Epic[2]    Allergies[3]    Family History   Problem Relation Age of Onset    No Known Problems Father     No Known Problems Mother      Social History     Socioeconomic History    Marital status:    Tobacco Use    Smoking status: Never    Smokeless tobacco: Never   Vaping Use    Vaping status: Never Used   Substance and Sexual Activity    Alcohol use: Yes     Comment: minimal    Drug use: Never       Available pre-op labs reviewed.  Lab Results   Component Value Date    WBC 7.5 11/06/2024    RBC 4.99 11/06/2024    HGB 14.8 11/06/2024    HCT 43.2 11/06/2024    MCV 86.6 11/06/2024    MCH 29.7 11/06/2024    MCHC 34.3 11/06/2024    RDW 11.9 11/06/2024    .0 11/06/2024     Lab Results   Component Value Date     11/06/2024    K 4.0 11/06/2024     11/06/2024    CO2 31.0 11/06/2024    BUN 12 11/06/2024    CREATSERUM 1.08 11/06/2024    GLU 85 11/06/2024    CA 10.0 11/06/2024          Vital Signs:  Body mass index is 26.36 kg/m².   height is 1.803 m (5' 11\") and weight is 85.7 kg (189 lb).  His oral temperature is 97.8 °F (36.6 °C). His blood pressure is 131/76 and his pulse is 60. His respiration is 18 and oxygen saturation is 100%.   Vitals:    11/11/24 1809 11/15/24 0635   BP:  131/76   Pulse:  60   Resp:  18   Temp:  97.8 °F (36.6 °C)   TempSrc:  Oral   SpO2:  100%   Weight: 83.9 kg (185 lb) 85.7 kg (189 lb)   Height: 1.803 m (5' 11\") 1.803 m (5' 11\")        Anesthesia Evaluation     Patient summary reviewed and Nursing notes reviewed    Airway   Mallampati: I  TM distance: >3 FB  Neck ROM: full  Dental - Dentition appears grossly intact     Pulmonary - negative ROS and normal exam    breath sounds clear to auscultation  Cardiovascular - negative ROS    ECG reviewed  Rhythm: regular  Rate: normal    Neuro/Psych - negative ROS     GI/Hepatic/Renal - negative ROS     Endo/Other - negative ROS   Abdominal                  Anesthesia Plan:   ASA:  2  Plan:   General  Monitors and Lines:   A-line and Double Lumen -ETT  Airway:  Double lumen ETT  Post-op Pain Management: IV analgesics      I have informed Marcos Albert and/or legal guardian or family member of the nature of the anesthetic plan, benefits, risks including possible dental damage if relevant, major complications, and any alternative forms of anesthetic management.   All of the patient's questions were answered to the best of my ability. The patient desires the anesthetic management as planned.  Golden Mak MD  11/15/2024 7:22 AM  Present on Admission:  **None**           [1]   Medications Prior to Admission   Medication Sig Dispense Refill Last Dose/Taking    ibuprofen 800 MG Oral Tab Take 1 tablet (800 mg total) by mouth 3 (three) times daily. 270 tablet 3    [2]   Current Facility-Administered Medications Ordered in Epic   Medication Dose Route Frequency Provider Last Rate Last Admin    lactated ringers infusion   Intravenous Continuous Angelica Mcghee, Cliff MIJARES MD 20 mL/hr at 11/15/24 0649 New Bag at 11/15/24 0649    ceFAZolin (Ancef)  2g in 10mL IV syringe premix  2 g Intravenous Once Angelica Mcghee, Cliff MIJARES MD         No current [facility]-ordered outpatient medications on file.   [3] No Known Allergies

## 2024-11-15 NOTE — H&P
Thoracic Surgery H&P Note     Name: Marcos Albert   Age: 28 year old   Sex: male.   MRN: [id number]    Reason for Consultation: anterior mediastinal mass     Consulting Physician: Dr. Willian Guillermo    Subjective:     Chief Complaint: \"I'm ready for surgery\"     History of Present Illness:   Mr. Albert is a 28 year old male presenting with an anterior mediastinal mass.      This was initially found on CXR for chest tightness. CT chest from 9/21/24 showed a 4.4x4.1x5.1cm anterior mediastinal cystic lesion with a thick rind. Patient was referred by his PCP to discuss next steps.      Patient reports intermittent flare ups of costochondritis for the past four years. The pain is located primarily on his left side and is non-severe. He has not had any pain in the past three weeks. He denies any cough, shortness of breath, fevers, chills, weight loss, dysphagia, diplopia, or weakness.     PMH unremarkable. No history of heart disease. No blood thinners. No prior thoracic surgeries. Never smoker. No family history of cancer.     Review Of Systems:   10 point review of systems was conducted and was negative except for the pertinent positives listed in the above HPI.    Past Medical History: History reviewed. No pertinent past medical history.    Past Surgical History:   Past Surgical History:   Procedure Laterality Date    Tonsillectomy      [place] teeth removed Bilateral        Social History:   Social History     Socioeconomic History    Marital status:      Spouse name: Not on file    Number of children: Not on file    Years of education: Not on file    Highest education level: Not on file   Occupational History    Not on file   Tobacco Use    Smoking status: Never    Smokeless tobacco: Never   Vaping Use    Vaping status: Never Used   Substance and Sexual Activity    Alcohol use: Yes     Comment: minimal    Drug use: Never    Sexual activity: Not on file   Other Topics Concern    Not on file   Social History  Narrative    Not on file     Social Drivers of Health     Financial Resource Strain: Not on file   Food Insecurity: Not on file   Transportation Needs: Not on file   Physical Activity: Not on file   Stress: Not on file   Social Connections: Not on file   Housing Stability: Low Risk  (2/19/2024)    Received from [facility]    Housing Stability     Mortgage Payment Concerns?: Not on file     Number of Places Lived in the Last Year: Not on file     Unstable Housing?: Not on file       Family History:   Family History   Problem Relation Age of Onset    No Known Problems Father     No Known Problems Mother        Allergies:  Allergies[1]    Medications:   Medications Ordered Prior to Encounter[2]   lactated ringers infusion   Intravenous Continuous    [COMPLETED] acetaminophen (Tylenol Extra Strength) tab 1,000 mg  1,000 mg Oral Once    ceFAZolin (Ancef) 2g in 10mL IV syringe premix  2 g Intravenous Once         Objective:      Vital Signs:  /76 (BP Location: Left arm)   Pulse 60   Temp 97.8 °F (36.6 °C) (Oral)   Resp 18   Ht 180.3 cm (5' 11\")   Wt 189 lb (85.7 kg)   SpO2 100%   BMI 26.36 kg/m²     Physical Exam:  General: well appearing male in no acute distress  HEENT: Normocephalic, PERRL, EOMI, no scleral icterus  Neck: Supple, trachea midline, no JVD, no masses. Thyroid not grossly enlarged  Nodes: no cervical or supraclavicular lymphadenopathy appreciated  Heart: regular rate and rhythm. No murmurs, rubs or gallops. No lower extremity edema.  Lungs: Normal respiratory effort. Clear to ascultation bilaterally.   Abdomen: Soft, Non-tender, non-distended. No hepatosplenomegaly noted.  Extremities: No clubbing or cyanosis. No lateralizing weakness  Neuro: No gross cranial nerve defects, no loss of sensation  Psych:  oriented to person place and time, normal mood and affect      Labs:   Lab Results   Component Value Date/Time    WBC 7.5 11/06/2024 01:21 PM    HGB 14.8 11/06/2024 01:21 PM     HCT 43.2 11/06/2024 01:21 PM    .0 11/06/2024 01:21 PM    MCV 86.6 11/06/2024 01:21 PM     Lab Results   Component Value Date/Time     11/06/2024 01:21 PM    K 4.0 11/06/2024 01:21 PM     11/06/2024 01:21 PM    CO2 31.0 11/06/2024 01:21 PM    BUN 12 11/06/2024 01:21 PM    GLU 85 11/06/2024 01:21 PM    CA 10.0 11/06/2024 01:21 PM     No results found for: \"INR\", \"PT\", \"PTT\"  No components found for: \"TROPI\"  Lab Results   Component Value Date/Time    ALB 4.4 09/24/2024 09:01 AM    TP 6.9 09/24/2024 09:01 AM    ALT 20 09/24/2024 09:01 AM    AST 18 09/24/2024 09:01 AM         Review of Data:   CT chest 9/24/24     FINDINGS:    LUNGS:  No visible pulmonary disease.    VASCULATURE:  Pulmonary vessels are unremarkable within the limits of a noncontrast CT.    OLGA:  No mass or adenopathy.    MEDIASTINUM:  Anterior AP window mediastinal 4.4 x 4.1 x 5.1 cm cystic lesion is noted.  Adjacent residual thymic tissue is noted.  Thick rind is noted.  CARDIAC:  No enlargement, pericardial thickening, or significant coronary artery calcification.  PLEURA:  No mass or effusion.    THORACIC AORTA:  Unremarkable as seen on non-contrast imaging.  CHEST WALL:  No mass or axillary adenopathy.    LIMITED ABDOMEN:  Limited images of the upper abdomen are unremarkable.    BONES:  No bony lesion or fracture.                     Impression   CONCLUSION:       1. No suspicious nodule, mass or consolidation lung parenchyma and self.     2. There is a large cystic mass with a thick rind in the anterior mediastinum adjacent to the residual thymic tissue.  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  A thymic cyst is of consideration.  A developing cystic thymoma is also of  consideration.  An MRI or CT of this region with contrast may be done for further evaluation.       Assessment/Plan:     Mr. Albert is a 28 year old male presenting with an anterior mediastinal mass.      This was initially found on CXR for chest  tightness. CT chest from 9/21/24 showed a 4.4x4.1x5.1cm anterior mediastinal cystic lesion with a thick rind. Patient was referred by his PCP to discuss next steps.      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 Discussed options including continued surveillance (repeat CT chest in 3 months), MRI, surgical resection, or CT guided biopsy. Imaging findings are not totally consistent with a benign cyst. Discussed the risks and benefits of surgery. Patient expressed understanding and would like to proceed with surgery.      -Plan to proceed with robotic assisted anterior mediastinal mass resection today with Dr. Dominguez.    Ene Collins PA-C  Thoracic Surgery           [1] No Known Allergies  [2]   No current facility-administered medications on file prior to encounter.     Current Outpatient Medications on File Prior to Encounter   Medication Sig Dispense Refill    ibuprofen 800 MG Oral Tab Take 1 tablet (800 mg total) by mouth 3 (three) times daily. 270 tablet 3

## 2024-11-15 NOTE — ANESTHESIA POSTPROCEDURE EVALUATION
Patient: Marcos Albert    Procedure Summary       Date: 11/15/24 Room / Location: [facility] MAIN OR  / [facility] MAIN OR    Anesthesia Start: 0736 Anesthesia Stop: 1011    Procedure: Flexible bronchoscopy, robotic assisted thoracoscopic surgery, anterior mediastinal mass resection Diagnosis: (Mediastinal mass)    Surgeons: Angelica Mcghee, Cliff MIJARES MD Anesthesiologist: Golden Mak MD    Anesthesia Type: general ASA Status: 2            Anesthesia Type: general    Vitals Value Taken Time   /80 11/15/24 1010   Temp 98.3 11/15/24 1019   Pulse 70 11/15/24 1017   Resp 16 11/15/24 1017   SpO2 100 % 11/15/24 1017   Vitals shown include unfiled device data.    [facility] AN Post Evaluation:   Patient Evaluated in PACU  Patient Participation: complete - patient participated  Level of Consciousness: awake and awake and alert  Pain Score: 2  Pain Management: adequate  Airway Patency:patent  Dental exam unchanged from preop  Yes    Nausea/Vomiting: none  Cardiovascular Status: acceptable, blood pressure returned to baseline and hemodynamically stable  Respiratory Status: acceptable, unassisted and spontaneous ventilation  Postoperative Hydration stable      Golden Mak MD  11/15/2024 10:19 AM

## 2024-11-15 NOTE — BRIEF OP NOTE
Pre-Operative Diagnosis: Anterior Mediastinal mass     Post-Operative Diagnosis: same     Procedure Performed:   Flexible bronchoscopy, robotic assisted thoracoscopic surgery, anterior mediastinal mass resection    Surgeons and Role:     * Cliff Dominguez Jr., MD - Primary    Assistant(s):  PA: Ene Collins PA-C     Surgical Findings: fluid filled anterior mediastinal mass with necrotic tissue, adhered to the lung      Specimen:   ID Type Source Tests Collected by Time Destination   1 : 1. Anterior mediastinal mass Tissue Mediastinum SURGICAL PATHOLOGY TISSUE Cliff Dominguez Jr., MD 11/15/2024 0940    A : 2. Anterior mediastinal mass tissue for culture Tissue Mediastinum ANAEROBIC CULTURE, TISSUE AEROBIC CULTURE, AFB CULTURE AND SMEAR, FUNGUS CULTURE AND SMEAR(INCLUDES STAIN) Cliff Dominguez Jr., MD 11/15/2024 0942      Estimated Blood Loss: 50cc    Disposition: PACU to ICU    Ene Collins PA-C  11/15/2024  10:09 AM

## 2024-11-16 ENCOUNTER — APPOINTMENT (OUTPATIENT)
Dept: GENERAL RADIOLOGY | Facility: HOSPITAL | Age: 29
DRG: 983 | End: 2024-11-16
Attending: HOSPITALIST
Payer: COMMERCIAL

## 2024-11-16 ENCOUNTER — APPOINTMENT (OUTPATIENT)
Dept: GENERAL RADIOLOGY | Facility: HOSPITAL | Age: 29
End: 2024-11-16
Attending: HOSPITALIST
Payer: COMMERCIAL

## 2024-11-16 LAB
ALBUMIN SERPL-MCNC: 4.2 G/DL (ref 3.2–4.8)
ALBUMIN/GLOB SERPL: 1.8 {RATIO} (ref 1–2)
ALP LIVER SERPL-CCNC: 72 U/L
ALT SERPL-CCNC: 16 U/L
ANION GAP SERPL CALC-SCNC: 4 MMOL/L (ref 0–18)
AST SERPL-CCNC: 23 U/L (ref ?–34)
BILIRUB SERPL-MCNC: 0.6 MG/DL (ref 0.3–1.2)
BUN BLD-MCNC: 10 MG/DL (ref 9–23)
BUN/CREAT SERPL: 9.9 (ref 10–20)
CALCIUM BLD-MCNC: 9.1 MG/DL (ref 8.7–10.4)
CHLORIDE SERPL-SCNC: 109 MMOL/L (ref 98–112)
CO2 SERPL-SCNC: 29 MMOL/L (ref 21–32)
CREAT BLD-MCNC: 1.01 MG/DL
DEPRECATED RDW RBC AUTO: 38.5 FL (ref 35.1–46.3)
EGFRCR SERPLBLD CKD-EPI 2021: 103 ML/MIN/1.73M2 (ref 60–?)
ERYTHROCYTE [DISTWIDTH] IN BLOOD BY AUTOMATED COUNT: 11.9 % (ref 11–15)
GLOBULIN PLAS-MCNC: 2.3 G/DL (ref 2–3.5)
GLUCOSE BLD-MCNC: 108 MG/DL (ref 70–99)
HCT VFR BLD AUTO: 38.7 %
HGB BLD-MCNC: 13.3 G/DL
MCH RBC QN AUTO: 30 PG (ref 26–34)
MCHC RBC AUTO-ENTMCNC: 34.4 G/DL (ref 31–37)
MCV RBC AUTO: 87.4 FL
OSMOLALITY SERPL CALC.SUM OF ELEC: 294 MOSM/KG (ref 275–295)
PLATELET # BLD AUTO: 207 10(3)UL (ref 150–450)
POTASSIUM SERPL-SCNC: 4.1 MMOL/L (ref 3.5–5.1)
PROT SERPL-MCNC: 6.5 G/DL (ref 5.7–8.2)
RBC # BLD AUTO: 4.43 X10(6)UL
SODIUM SERPL-SCNC: 142 MMOL/L (ref 136–145)
WBC # BLD AUTO: 15.9 X10(3) UL (ref 4–11)

## 2024-11-16 PROCEDURE — 71045 X-RAY EXAM CHEST 1 VIEW: CPT | Performed by: HOSPITALIST

## 2024-11-16 PROCEDURE — 99233 SBSQ HOSP IP/OBS HIGH 50: CPT | Performed by: HOSPITALIST

## 2024-11-16 RX ORDER — KETOROLAC TROMETHAMINE 15 MG/ML
15 INJECTION, SOLUTION INTRAMUSCULAR; INTRAVENOUS EVERY 6 HOURS
Status: DISCONTINUED | OUTPATIENT
Start: 2024-11-16 | End: 2024-11-17

## 2024-11-16 NOTE — PROGRESS NOTES
[facility]  part of [facility]    Progress Note    Marcos Albert Patient Status:  Inpatient    1995 MRN [id number]   Location [facility]5W Attending Mike Morgan MD   Hosp Day # 1 PCP Brady Guillermo MD     Chief Complaint: Post op. Mediastinoscopy and mass resection     Subjective:   Marcos Albert is having nausea. He c/o pain in his L shoulder. No SOB no dizziness. Has been up a few times yesterday walking in room.       Objective:   Objective:    Blood pressure 119/71, pulse 99, temperature 99.1 °F (37.3 °C), temperature source Oral, resp. rate 18, height 5' 11\" (1.803 m), weight 194 lb 3.2 oz (88.1 kg), SpO2 96%.    Physical Exam:    General: No acute distress. Chest tube in place   Respiratory: Clear to auscultation bilaterally. No wheezes. No rhonchi.  Cardiovascular: S1, S2. Regular rate and rhythm. No murmurs, rubs or gallops.   Abdomen: Soft, nontender, nondistended.  Positive bowel sounds. No rebound or guarding.  Neurologic: No focal neurological deficits.   Musculoskeletal: Moves all extremities.  Extremities: No edema.      Results:   Results:    Labs:  No results for input(s): \"WBC\", \"HGB\", \"MCV\", \"PLT\", \"BAND\", \"INR\" in the last 168 hours.    Invalid input(s): \"LYM#\", \"MONO#\", \"BASOS#\", \"EOSIN#\"    No results for input(s): \"GLU\", \"BUN\", \"CREATSERUM\", \"GFRAA\", \"GFRNAA\", \"CA\", \"ALB\", \"NA\", \"K\", \"CL\", \"CO2\", \"ALKPHO\", \"AST\", \"ALT\", \"BILT\", \"TP\" in the last 168 hours.    CrCl cannot be calculated (Patient's most recent lab result is older than the maximum 7 days allowed.).    No results for input(s): \"PTP\", \"INR\" in the last 168 hours.         Culture:  Hospital Encounter on 11/15/24   1. Tissue Aerobic Culture     Status: None (Preliminary result)    Collection Time: 11/15/24  9:42 AM    Specimen: Mediastinum; Tissue   Result Value Ref Range    Tissue Smear No WBCs seen N/A    Tissue Smear No organisms seen N/A       Cardiac  No results for input(s):  \"TROP\", \"PBNP\" in the last 168 hours.      Imaging: Imaging data reviewed in Epic.  No results found.    Medications:    ketorolac  15 mg Intravenous Q6H    acetaminophen  1,000 mg Intravenous Q6H    heparin  5,000 Units Subcutaneous 2 times per day         Assessment and Plan:   Assessment & Plan:        Mediastinal cystic mass  Thoracic surgery on consult.   S/p flexible bronchoscopy robotic assisted thorascopic surgery, anterior mediastinal mass resection 11/15/24   Pain control.   Chest tube per CT surgery   Path pending. Cx's pending   Will get labs, CXR today      >55min spent, >50% spent counseling and coordinating care in the form of educating pt/family and d/w consultants and staff. Most of the time spent discussing the above plan.        Plan of care discussed with patient or family at bedside.    Mike Morgan MD  Hospitalist          Supplementary Documentation:     Quality:  DVT Prophylaxis: heparin   CODE status: Full  Dispo: per clinical course            Estimated date of discharge: TBD  Discharge is dependent on: clinical stability  At this point Mr. Albert is expected to be discharge to: home

## 2024-11-16 NOTE — PLAN OF CARE
Problem: RESPIRATORY - ADULT  Goal: Achieves optimal ventilation and oxygenation  Description: INTERVENTIONS:  - Assess for changes in respiratory status  - Assess for changes in mentation and behavior  - Position to facilitate oxygenation and minimize respiratory effort  - Oxygen supplementation based on oxygen saturation or ABGs  - Provide Smoking Cessation handout, if applicable  - Encourage broncho-pulmonary hygiene including cough, deep breathe, Incentive Spirometry  - Assess the need for suctioning and perform as needed  - Assess and instruct to report SOB or any respiratory difficulty  - Respiratory Therapy support as indicated  - Manage/alleviate anxiety  - Monitor for signs/symptoms of CO2 retention  Outcome: Progressing     Problem: GASTROINTESTINAL - ADULT  Goal: Minimal or absence of nausea and vomiting  Description: INTERVENTIONS:  - Maintain adequate hydration with IV or PO as ordered and tolerated  - Nasogastric tube to low intermittent suction as ordered  - Evaluate effectiveness of ordered antiemetic medications  - Provide nonpharmacologic comfort measures as appropriate  - Advance diet as tolerated, if ordered  - Obtain nutritional consult as needed  - Evaluate fluid balance  Outcome: Progressing     Problem: METABOLIC/FLUID AND ELECTROLYTES - ADULT  Goal: Electrolytes maintained within normal limits  Description: INTERVENTIONS:  - Monitor labs and rhythm and assess patient for signs and symptoms of electrolyte imbalances  - Administer electrolyte replacement as ordered  - Monitor response to electrolyte replacements, including rhythm and repeat lab results as appropriate  - Fluid restriction as ordered  - Instruct patient on fluid and nutrition restrictions as appropriate  Outcome: Progressing     Problem: HEMATOLOGIC - ADULT  Goal: Free from bleeding injury  Description: (Example usage: patient with low platelets)  INTERVENTIONS:  - Avoid intramuscular injections, enemas and rectal medication  administration  - Ensure safe mobilization of patient  - Hold pressure on venipuncture sites to achieve adequate hemostasis  - Assess for signs and symptoms of internal bleeding  - Monitor lab trends  - Patient is to report abnormal signs of bleeding to staff  - Avoid use of toothpicks and dental floss  - Use electric shaver for shaving  - Use soft bristle tooth brush  - Limit straining and forceful nose blowing  Outcome: Progressing     Aox4  VSS, RA  Cx tube dressing C/D/I  Nauseated, emesis x1 - Zofran and Reglan given ATC  Safety precautions maintained  SBA x1 w/ walker

## 2024-11-16 NOTE — PLAN OF CARE
A&Ox4. Pt ambulates standby with rolling walker, 0.9 infusing, voiding independently, tolerating regular diet, on room air, dilaudid provided as needed for pain. Frequent rounding by nursing staff. Safety precautions maintained/call light within reach. Chest tube on water seal, possible removal 11/17. Plan to manage pain and nausea with meds as needed.    Problem: Patient Centered Care  Goal: Patient preferences are identified and integrated in the patient's plan of care  Description: Interventions:  - What would you like us to know as we care for you? From home with wife  - Provide timely, complete, and accurate information to patient/family  - Incorporate patient and family knowledge, values, beliefs, and cultural backgrounds into the planning and delivery of care  - Encourage patient/family to participate in care and decision-making at the level they choose  - Honor patient and family perspectives and choices  Outcome: Progressing     Problem: Patient/Family Goals  Goal: Patient/Family Long Term Goal  Description: Patient's Long Term Goal: discharge from hospital    Interventions:  - monitor vital signs   - monitor appropriate labs  - pain management   - administer medications per order  - follow MD orders  - diagnostics per order  - update and inform patient and family on plan of care  - discharge planning  - See additional Care Plan goals for specific interventions  Outcome: Progressing  Goal: Patient/Family Short Term Goal  Description: Patient's Short Term Goal: control pain    Interventions:   - monitor vital signs   - monitor appropriate labs  - pain management   - administer medications per order  - follow MD orders  - diagnostics per order  - update and inform patient and family on plan of care  - See additional Care Plan goals for specific interventions  Outcome: Progressing     Problem: RESPIRATORY - ADULT  Goal: Achieves optimal ventilation and oxygenation  Description: INTERVENTIONS:  - Assess for  changes in respiratory status  - Assess for changes in mentation and behavior  - Position to facilitate oxygenation and minimize respiratory effort  - Oxygen supplementation based on oxygen saturation or ABGs  - Provide Smoking Cessation handout, if applicable  - Encourage broncho-pulmonary hygiene including cough, deep breathe, Incentive Spirometry  - Assess the need for suctioning and perform as needed  - Assess and instruct to report SOB or any respiratory difficulty  - Respiratory Therapy support as indicated  - Manage/alleviate anxiety  - Monitor for signs/symptoms of CO2 retention  Outcome: Progressing     Problem: GASTROINTESTINAL - ADULT  Goal: Minimal or absence of nausea and vomiting  Description: INTERVENTIONS:  - Maintain adequate hydration with IV or PO as ordered and tolerated  - Nasogastric tube to low intermittent suction as ordered  - Evaluate effectiveness of ordered antiemetic medications  - Provide nonpharmacologic comfort measures as appropriate  - Advance diet as tolerated, if ordered  - Obtain nutritional consult as needed  - Evaluate fluid balance  Outcome: Progressing     Problem: METABOLIC/FLUID AND ELECTROLYTES - ADULT  Goal: Electrolytes maintained within normal limits  Description: INTERVENTIONS:  - Monitor labs and rhythm and assess patient for signs and symptoms of electrolyte imbalances  - Administer electrolyte replacement as ordered  - Monitor response to electrolyte replacements, including rhythm and repeat lab results as appropriate  - Fluid restriction as ordered  - Instruct patient on fluid and nutrition restrictions as appropriate  Outcome: Progressing     Problem: SKIN/TISSUE INTEGRITY - ADULT  Goal: Skin integrity remains intact  Description: INTERVENTIONS  - Assess and document risk factors for pressure ulcer development  - Assess and document skin integrity  - Monitor for areas of redness and/or skin breakdown  - Initiate interventions, skin care algorithm/standards of care  as needed  Outcome: Progressing     Problem: HEMATOLOGIC - ADULT  Goal: Free from bleeding injury  Description: (Example usage: patient with low platelets)  INTERVENTIONS:  - Avoid intramuscular injections, enemas and rectal medication administration  - Ensure safe mobilization of patient  - Hold pressure on venipuncture sites to achieve adequate hemostasis  - Assess for signs and symptoms of internal bleeding  - Monitor lab trends  - Patient is to report abnormal signs of bleeding to staff  - Avoid use of toothpicks and dental floss  - Use electric shaver for shaving  - Use soft bristle tooth brush  - Limit straining and forceful nose blowing  Outcome: Progressing     Problem: PAIN - ADULT  Goal: Verbalizes/displays adequate comfort level or patient's stated pain goal  Description: INTERVENTIONS:  - Encourage pt to monitor pain and request assistance  - Assess pain using appropriate pain scale  - Administer analgesics based on type and severity of pain and evaluate response  - Implement non-pharmacological measures as appropriate and evaluate response  - Consider cultural and social influences on pain and pain management  - Manage/alleviate anxiety  - Utilize distraction and/or relaxation techniques  - Monitor for opioid side effects  - Notify MD/LIP if interventions unsuccessful or patient reports new pain  - Anticipate increased pain with activity and pre-medicate as appropriate  Outcome: Progressing     Problem: SAFETY ADULT - FALL  Goal: Free from fall injury  Description: INTERVENTIONS:  - Assess pt frequently for physical needs  - Identify cognitive and physical deficits and behaviors that affect risk of falls.  - [place] fall precautions as indicated by assessment.  - Educate pt/family on patient safety including physical limitations  - Instruct pt to call for assistance with activity based on assessment  - Modify environment to reduce risk of injury  - Provide assistive devices as appropriate  - Consider  OT/PT consult to assist with strengthening/mobility  - Encourage toileting schedule  Outcome: Progressing     Problem: DISCHARGE PLANNING  Goal: Discharge to home or other facility with appropriate resources  Description: INTERVENTIONS:  - Identify barriers to discharge w/pt and caregiver  - Include patient/family/discharge partner in discharge planning  - Arrange for needed discharge resources and transportation as appropriate  - Identify discharge learning needs (meds, wound care, etc)  - Arrange for interpreters to assist at discharge as needed  - Consider post-discharge preferences of patient/family/discharge partner  - Complete POLST form as appropriate  - Assess patient's ability to be responsible for managing their own health  - Refer to Case Management Department for coordinating discharge planning if the patient needs post-hospital services based on physician/LIP order or complex needs related to functional status, cognitive ability or social support system  Outcome: Progressing

## 2024-11-16 NOTE — PLAN OF CARE
Problem: Patient Centered Care  Goal: Patient preferences are identified and integrated in the patient's plan of care  Description: Interventions:  - What would you like us to know as we care for you? From home with wife  - Provide timely, complete, and accurate information to patient/family  - Incorporate patient and family knowledge, values, beliefs, and cultural backgrounds into the planning and delivery of care  - Encourage patient/family to participate in care and decision-making at the level they choose  - Honor patient and family perspectives and choices  Outcome: Progressing     Problem: Patient/Family Goals  Goal: Patient/Family Long Term Goal  Description: Patient's Long Term Goal: discharge from hospital    Interventions:  - monitor vital signs   - monitor appropriate labs  - pain management   - administer medications per order  - follow MD orders  - diagnostics per order  - update and inform patient and family on plan of care  - discharge planning  - See additional Care Plan goals for specific interventions  Outcome: Progressing     Problem: RESPIRATORY - ADULT  Goal: Achieves optimal ventilation and oxygenation  Description: INTERVENTIONS:  - Assess for changes in respiratory status  - Assess for changes in mentation and behavior  - Position to facilitate oxygenation and minimize respiratory effort  - Oxygen supplementation based on oxygen saturation or ABGs  - Provide Smoking Cessation handout, if applicable  - Encourage broncho-pulmonary hygiene including cough, deep breathe, Incentive Spirometry  - Assess the need for suctioning and perform as needed  - Assess and instruct to report SOB or any respiratory difficulty  - Respiratory Therapy support as indicated  - Manage/alleviate anxiety  - Monitor for signs/symptoms of CO2 retention  Outcome: Progressing     Problem: METABOLIC/FLUID AND ELECTROLYTES - ADULT  Goal: Electrolytes maintained within normal limits  Description: INTERVENTIONS:  - Monitor  labs and rhythm and assess patient for signs and symptoms of electrolyte imbalances  - Administer electrolyte replacement as ordered  - Monitor response to electrolyte replacements, including rhythm and repeat lab results as appropriate  - Fluid restriction as ordered  - Instruct patient on fluid and nutrition restrictions as appropriate  Outcome: Progressing     Problem: SKIN/TISSUE INTEGRITY - ADULT  Goal: Skin integrity remains intact  Description: INTERVENTIONS  - Assess and document risk factors for pressure ulcer development  - Assess and document skin integrity  - Monitor for areas of redness and/or skin breakdown  - Initiate interventions, skin care algorithm/standards of care as needed  Outcome: Progressing     Problem: HEMATOLOGIC - ADULT  Goal: Free from bleeding injury  Description: (Example usage: patient with low platelets)  INTERVENTIONS:  - Avoid intramuscular injections, enemas and rectal medication administration  - Ensure safe mobilization of patient  - Hold pressure on venipuncture sites to achieve adequate hemostasis  - Assess for signs and symptoms of internal bleeding  - Monitor lab trends  - Patient is to report abnormal signs of bleeding to staff  - Avoid use of toothpicks and dental floss  - Use electric shaver for shaving  - Use soft bristle tooth brush  - Limit straining and forceful nose blowing  Outcome: Progressing     Problem: SAFETY ADULT - FALL  Goal: Free from fall injury  Description: INTERVENTIONS:  - Assess pt frequently for physical needs  - Identify cognitive and physical deficits and behaviors that affect risk of falls.  - [place] fall precautions as indicated by assessment.  - Educate pt/family on patient safety including physical limitations  - Instruct pt to call for assistance with activity based on assessment  - Modify environment to reduce risk of injury  - Provide assistive devices as appropriate  - Consider OT/PT consult to assist with strengthening/mobility  -  Encourage toileting schedule  Outcome: Progressing     Problem: DISCHARGE PLANNING  Goal: Discharge to home or other facility with appropriate resources  Description: INTERVENTIONS:  - Identify barriers to discharge w/pt and caregiver  - Include patient/family/discharge partner in discharge planning  - Arrange for needed discharge resources and transportation as appropriate  - Identify discharge learning needs (meds, wound care, etc)  - Arrange for interpreters to assist at discharge as needed  - Consider post-discharge preferences of patient/family/discharge partner  - Complete POLST form as appropriate  - Assess patient's ability to be responsible for managing their own health  - Refer to Case Management Department for coordinating discharge planning if the patient needs post-hospital services based on physician/LIP order or complex needs related to functional status, cognitive ability or social support system  Outcome: Progressing    Patient given prn antiemetics and pain medications per MAR. Maintained on full liquid diet due to vomiting. Patient on room air. Chest tube dressing dry and intact. Chest tube connected to continuous suction per MD order. Safety and fall precautions maintained. Bed locked in lowest position, I-bed awareness on, call light within reach. Frequent rounding by nursing staff.

## 2024-11-16 NOTE — PROGRESS NOTES
Thoracic Surgery Progress Note     Marcos Albert is a 28 year old male. MRN [id number]. Admitted 11/15/2024    SUBJECTIVE/24H EVENTS:     No acute events overnight. Had some nausea from the antibiotic. This is better now, but he has not eaten much. Feels hungry. Drinking some water. Has not ambulated in the halls. Using IS. Has chest tube discomfort, worse with movement.     Objective:     VITALS:     Temp (24hrs), Av.1 °F (36.7 °C), Min:97 °F (36.1 °C), Max:99.5 °F (37.5 °C)   /71 (BP Location: Right arm)   Pulse 99   Temp 99.1 °F (37.3 °C) (Oral)   Resp 18   Ht 180.3 cm (5' 11\")   Wt 194 lb 3.2 oz (88.1 kg)   SpO2 96%   BMI 27.09 kg/m²     EXAM:   General: well appearing male in no acute distress  Heart: regular rate and rhythm.   Lungs: Normal respiratory effort. Equal chest rise bilaterally  Incisions: c/d/i   Chest tube:   Air Leak: intermittent E1  Output: serosanguineous   24 hour: 187 cc  Suction: yes  Abdomen: Soft, Non-tender, non-distended.  Extremities: No clubbing or cyanosis. 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     -Chest tube to water seal.   -General diet  -Stop fluids once tolerating a diet. PRN nausea meds.   -Pain control. Add toradol.   -Maintain saturations above 90%.   -Ambulate 3-4 times per day in the halls. Spend majority of day out of bed, in chair. Encouraged cough and IS use 10x hourly.       Patient discussed with Dr. Dominguez.     Ene Collins PA-C  Thoracic  Surgery  Pager: [phone]

## 2024-11-17 VITALS
SYSTOLIC BLOOD PRESSURE: 141 MMHG | BODY MASS INDEX: 27.19 KG/M2 | RESPIRATION RATE: 18 BRPM | HEIGHT: 71 IN | WEIGHT: 194.19 LBS | TEMPERATURE: 99 F | OXYGEN SATURATION: 96 % | DIASTOLIC BLOOD PRESSURE: 85 MMHG | HEART RATE: 88 BPM

## 2024-11-17 PROCEDURE — 99239 HOSP IP/OBS DSCHRG MGMT >30: CPT | Performed by: INTERNAL MEDICINE

## 2024-11-17 RX ORDER — OXYCODONE HYDROCHLORIDE 5 MG/1
5 TABLET ORAL EVERY 4 HOURS PRN
Qty: 30 TABLET | Refills: 0 | Status: SHIPPED | OUTPATIENT
Start: 2024-11-17

## 2024-11-17 NOTE — PLAN OF CARE
Problem: Patient Centered Care  Goal: Patient preferences are identified and integrated in the patient's plan of care  Description: Interventions:  - What would you like us to know as we care for you? From home with wife  - Provide timely, complete, and accurate information to patient/family  - Incorporate patient and family knowledge, values, beliefs, and cultural backgrounds into the planning and delivery of care  - Encourage patient/family to participate in care and decision-making at the level they choose  - Honor patient and family perspectives and choices  Outcome: Progressing     Problem: Patient/Family Goals  Goal: Patient/Family Long Term Goal  Description: Patient's Long Term Goal: discharge from hospital    Interventions:  - monitor vital signs   - monitor appropriate labs  - pain management   - administer medications per order  - follow MD orders  - diagnostics per order  - update and inform patient and family on plan of care  - discharge planning  - See additional Care Plan goals for specific interventions  Outcome: Progressing  Goal: Patient/Family Short Term Goal  Description: Patient's Short Term Goal: control pain    Interventions:   - monitor vital signs   - monitor appropriate labs  - pain management   - administer medications per order  - follow MD orders  - diagnostics per order  - update and inform patient and family on plan of care  - See additional Care Plan goals for specific interventions  Outcome: Progressing     Problem: RESPIRATORY - ADULT  Goal: Achieves optimal ventilation and oxygenation  Description: INTERVENTIONS:  - Assess for changes in respiratory status  - Assess for changes in mentation and behavior  - Position to facilitate oxygenation and minimize respiratory effort  - Oxygen supplementation based on oxygen saturation or ABGs  - Provide Smoking Cessation handout, if applicable  - Encourage broncho-pulmonary hygiene including cough, deep breathe, Incentive Spirometry  -  Assess the need for suctioning and perform as needed  - Assess and instruct to report SOB or any respiratory difficulty  - Respiratory Therapy support as indicated  - Manage/alleviate anxiety  - Monitor for signs/symptoms of CO2 retention  Outcome: Progressing     Problem: GASTROINTESTINAL - ADULT  Goal: Minimal or absence of nausea and vomiting  Description: INTERVENTIONS:  - Maintain adequate hydration with IV or PO as ordered and tolerated  - Nasogastric tube to low intermittent suction as ordered  - Evaluate effectiveness of ordered antiemetic medications  - Provide nonpharmacologic comfort measures as appropriate  - Advance diet as tolerated, if ordered  - Obtain nutritional consult as needed  - Evaluate fluid balance  Outcome: Progressing     Problem: METABOLIC/FLUID AND ELECTROLYTES - ADULT  Goal: Electrolytes maintained within normal limits  Description: INTERVENTIONS:  - Monitor labs and rhythm and assess patient for signs and symptoms of electrolyte imbalances  - Administer electrolyte replacement as ordered  - Monitor response to electrolyte replacements, including rhythm and repeat lab results as appropriate  - Fluid restriction as ordered  - Instruct patient on fluid and nutrition restrictions as appropriate  Outcome: Progressing     Problem: SKIN/TISSUE INTEGRITY - ADULT  Goal: Skin integrity remains intact  Description: INTERVENTIONS  - Assess and document risk factors for pressure ulcer development  - Assess and document skin integrity  - Monitor for areas of redness and/or skin breakdown  - Initiate interventions, skin care algorithm/standards of care as needed  Outcome: Progressing     Problem: HEMATOLOGIC - ADULT  Goal: Free from bleeding injury  Description: (Example usage: patient with low platelets)  INTERVENTIONS:  - Avoid intramuscular injections, enemas and rectal medication administration  - Ensure safe mobilization of patient  - Hold pressure on venipuncture sites to achieve adequate  hemostasis  - Assess for signs and symptoms of internal bleeding  - Monitor lab trends  - Patient is to report abnormal signs of bleeding to staff  - Avoid use of toothpicks and dental floss  - Use electric shaver for shaving  - Use soft bristle tooth brush  - Limit straining and forceful nose blowing  Outcome: Progressing     Problem: SAFETY ADULT - FALL  Goal: Free from fall injury  Description: INTERVENTIONS:  - Assess pt frequently for physical needs  - Identify cognitive and physical deficits and behaviors that affect risk of falls.  - Fallentimber fall precautions as indicated by assessment.  - Educate pt/family on patient safety including physical limitations  - Instruct pt to call for assistance with activity based on assessment  - Modify environment to reduce risk of injury  - Provide assistive devices as appropriate  - Consider OT/PT consult to assist with strengthening/mobility  - Encourage toileting schedule  Outcome: Progressing     Problem: DISCHARGE PLANNING  Goal: Discharge to home or other facility with appropriate resources  Description: INTERVENTIONS:  - Identify barriers to discharge w/pt and caregiver  - Include patient/family/discharge partner in discharge planning  - Arrange for needed discharge resources and transportation as appropriate  - Identify discharge learning needs (meds, wound care, etc)  - Arrange for interpreters to assist at discharge as needed  - Consider post-discharge preferences of patient/family/discharge partner  - Complete POLST form as appropriate  - Assess patient's ability to be responsible for managing their own health  - Refer to Case Management Department for coordinating discharge planning if the patient needs post-hospital services based on physician/LIP order or complex needs related to functional status, cognitive ability or social support system  Outcome: Progressing     Problem: PAIN - ADULT  Goal: Verbalizes/displays adequate comfort level or patient's stated  pain goal  Description: INTERVENTIONS:  - Encourage pt to monitor pain and request assistance  - Assess pain using appropriate pain scale  - Administer analgesics based on type and severity of pain and evaluate response  - Implement non-pharmacological measures as appropriate and evaluate response  - Consider cultural and social influences on pain and pain management  - Manage/alleviate anxiety  - Utilize distraction and/or relaxation techniques  - Monitor for opioid side effects  - Notify MD/LIP if interventions unsuccessful or patient reports new pain  - Anticipate increased pain with activity and pre-medicate as appropriate  Outcome: Not Progressing

## 2024-11-17 NOTE — DISCHARGE SUMMARY
[facility]  part of [facility]    DISCHARGE SUMMARY     Marcos Albert Patient Status:  Inpatient    1995 MRN [id number]   Location [facility]5W Attending Josh Godinez MD   Hosp Day # 2 PCP Brady Guillermo MD     Date of Admission: 11/15/2024  Date of Discharge:  2004    Discharge Disposition: Final discharge disposition not confirmed    Discharge Diagnosis:     Mediastinal cystic mass    History of Present Illness:     The patient is a 28-year-old  male who was recently evaluated for chest tightness. Chest x-ray was abnormal, showing mediastinal abnormality. CT scan of the chest showed anterior mediastinal left-sided cystic mass, could be pericardial cyst, rule out thymoma. The mass size was 4.4 cm. The patient was referred to Thoracic Surgery and scheduled today for above-mentioned procedure by Dr. Dominguez. Postoperatively transferred to PACU for further monitoring.     Brief Synopsis:     Patient tolerated the surgery well. Chest tube remove. He is now tolerating his diet and denies any chest pain, sob, n/v/d or any other complaints.  The patient is being discharged with instructions to follow up with PCP and the Surgery team.   Discharge meds ordered by the surgery team.     Patient is to remain compliant with all discharge medications and instructions and to follow up as advised.   Patient encouraged to make healthy lifestyle and dietary changes.    Lace+ Score: 10  59-90 High Risk  29-58 Medium Risk  0-28   Low Risk       TCM Follow-Up Recommendation:  LACE < 29: Low Risk of readmission after discharge from the hospital; Still recommend for TCM follow-up.      Procedures during hospitalization:   Flexible bronchoscopy, anterior mediastinal mass resection 11/15/2024    Incidental or significant findings and recommendations (brief descriptions):  None    Lab/Test results pending at Discharge:   Pathology and cultures    Consultants:  CT  surgery    Discharge Medication List:     Discharge Medications        START taking these medications        Instructions Prescription details   oxyCODONE 5 MG Tabs      Take 1 tablet (5 mg total) by mouth every 4 (four) hours as needed for Pain.   Quantity: 30 tablet  Refills: 0            CONTINUE taking these medications        Instructions Prescription details   ibuprofen 800 MG Tabs  Commonly known as: Motrin      Take 1 tablet (800 mg total) by mouth 3 (three) times daily.   Quantity: 270 tablet  Refills: 3               Where to Get Your Medications        These medications were sent to [id number] - [place], [place] - [address] [phone], [phone]  [address] [address]      Phone: [phone]   oxyCODONE 5 MG Tabs         [place]    Follow-up appointment:   Brady Gordon MD  [address]  [facility] [address] [phone]    Follow up in 1 week(s)      Angelica Mcghee, Cliff MIJARES MD  [address] [facility] [address] [phone]    Follow up in 1 week(s)      Appointments for Next 30 Days 11/17/2024 - 12/17/2024      None            Vital signs:  Temp:  [98.5 °F (36.9 °C)-100.1 °F (37.8 °C)] 98.6 °F (37 °C)  Pulse:  [] 88  Resp:  [18-20] 18  BP: (132-157)/(80-97) 141/85  SpO2:  [93 %-98 %] 96 %    Physical Exam:    Gen: NAD AO x3  Chest: good air entry CTABL  CVS: normal s1 and s2 RR  Abd: NABS soft NT ND  Neuro: CN 2-12 grossly intact  Ext: no edema in bilateral LE    -----------------------------------------------------------------------------------------------  PATIENT DISCHARGE INSTRUCTIONS: See electronic chart    Josh Godinez MD  Hospitalist    Time spent:  > 30 minutes    The 21st Century Cures Act makes medical notes like these available to patients in the interest of transparency. Please be advised this is a medical document. Medical documents are intended to carry relevant information, facts as evident, and the clinical  opinion of the practitioner. The medical note is intended as peer to peer communication and may appear blunt or direct. It is written in medical language and may contain abbreviations or verbiage that are unfamiliar.

## 2024-11-17 NOTE — PROGRESS NOTES
Thoracic Surgery Progress Note     Marcos Albert is a 28 year old male. MRN [id number]. Admitted 11/15/2024    SUBJECTIVE/24H EVENTS:     No acute events overnight. Nausea was resolved but feeling nauseous currently. Thinks it is because he hasn't eaten. Pain better controlled. Ambulating and using IS.     Objective:     VITALS:     Temp (24hrs), Av.1 °F (37.3 °C), Min:98.5 °F (36.9 °C), Max:100.1 °F (37.8 °C)   BP (!) 157/97 (BP Location: Right arm)   Pulse 88   Temp 98.6 °F (37 °C) (Oral)   Resp 18   Ht 180.3 cm (5' 11\")   Wt 194 lb 3.2 oz (88.1 kg)   SpO2 96%   BMI 27.09 kg/m²     EXAM:   General: well appearing male in no acute distress  Heart: regular rate and rhythm.   Lungs: Normal respiratory effort. Equal chest rise bilaterally  Incisions: c/d/i   Chest tube:   Air Leak: no  Output: serosanguineous   24 hour: 70 cc  Suction: no  Abdomen: Soft, Non-tender, non-distended.  Extremities: No clubbing or cyanosis. No lateralizing weakness  Neuro: no focal defects  Psych:  oriented to person place and time, normal mood and affect      Intake/Output Summary (Last 24 hours) at 2024 0836  Last data filed at 2024 0816  Gross per 24 hour   Intake 1360 ml   Output 72 ml   Net 1288 ml         MEDS:   ketorolac  15 mg Intravenous Q6H    acetaminophen  1,000 mg Intravenous Q6H    heparin  5,000 Units Subcutaneous 2 times per day       Assessment/Plan:     28 year old male 2 days post op from robotic assisted anterior mediastinal mass resection.     -Chest tube removed.   -General diet  -PRN nausea meds.   -Pain control. Encourage PO pain meds.   -Maintain saturations above 90%.   -Ambulate 3-4 times per day in the halls. Spend majority of day out of bed, in chair. Encouraged cough and IS use 10x hourly.     -Okay to discharge from thoracic surgery standpoint once cleared with other services and nausea controlled. Follow up in 1 week.     Patient discussed with Dr. Dominguez.     Ene Collins,  VENKAT  Thoracic Surgery  Pager: [phone]           No bruits; no thyromegaly or nodules

## 2024-11-17 NOTE — PLAN OF CARE
Patient discharged to home. IV removed, discharge education provided, patient sent with all personal belongings, medications and discharge instructions. Addressed additional questions.    Problem: Patient Centered Care  Goal: Patient preferences are identified and integrated in the patient's plan of care  Description: Interventions:  - What would you like us to know as we care for you? From home with wife  - Provide timely, complete, and accurate information to patient/family  - Incorporate patient and family knowledge, values, beliefs, and cultural backgrounds into the planning and delivery of care  - Encourage patient/family to participate in care and decision-making at the level they choose  - Honor patient and family perspectives and choices  Outcome: Adequate for Discharge     Problem: Patient/Family Goals  Goal: Patient/Family Long Term Goal  Description: Patient's Long Term Goal: discharge from hospital    Interventions:  - monitor vital signs   - monitor appropriate labs  - pain management   - administer medications per order  - follow MD orders  - diagnostics per order  - update and inform patient and family on plan of care  - discharge planning  - See additional Care Plan goals for specific interventions  Outcome: Adequate for Discharge  Goal: Patient/Family Short Term Goal  Description: Patient's Short Term Goal: control pain    Interventions:   - monitor vital signs   - monitor appropriate labs  - pain management   - administer medications per order  - follow MD orders  - diagnostics per order  - update and inform patient and family on plan of care  - See additional Care Plan goals for specific interventions  Outcome: Adequate for Discharge     Problem: RESPIRATORY - ADULT  Goal: Achieves optimal ventilation and oxygenation  Description: INTERVENTIONS:  - Assess for changes in respiratory status  - Assess for changes in mentation and behavior  - Position to facilitate oxygenation and minimize respiratory  effort  - Oxygen supplementation based on oxygen saturation or ABGs  - Provide Smoking Cessation handout, if applicable  - Encourage broncho-pulmonary hygiene including cough, deep breathe, Incentive Spirometry  - Assess the need for suctioning and perform as needed  - Assess and instruct to report SOB or any respiratory difficulty  - Respiratory Therapy support as indicated  - Manage/alleviate anxiety  - Monitor for signs/symptoms of CO2 retention  Outcome: Adequate for Discharge     Problem: GASTROINTESTINAL - ADULT  Goal: Minimal or absence of nausea and vomiting  Description: INTERVENTIONS:  - Maintain adequate hydration with IV or PO as ordered and tolerated  - Nasogastric tube to low intermittent suction as ordered  - Evaluate effectiveness of ordered antiemetic medications  - Provide nonpharmacologic comfort measures as appropriate  - Advance diet as tolerated, if ordered  - Obtain nutritional consult as needed  - Evaluate fluid balance  Outcome: Adequate for Discharge     Problem: METABOLIC/FLUID AND ELECTROLYTES - ADULT  Goal: Electrolytes maintained within normal limits  Description: INTERVENTIONS:  - Monitor labs and rhythm and assess patient for signs and symptoms of electrolyte imbalances  - Administer electrolyte replacement as ordered  - Monitor response to electrolyte replacements, including rhythm and repeat lab results as appropriate  - Fluid restriction as ordered  - Instruct patient on fluid and nutrition restrictions as appropriate  Outcome: Adequate for Discharge     Problem: SKIN/TISSUE INTEGRITY - ADULT  Goal: Skin integrity remains intact  Description: INTERVENTIONS  - Assess and document risk factors for pressure ulcer development  - Assess and document skin integrity  - Monitor for areas of redness and/or skin breakdown  - Initiate interventions, skin care algorithm/standards of care as needed  Outcome: Adequate for Discharge     Problem: HEMATOLOGIC - ADULT  Goal: Free from bleeding  injury  Description: (Example usage: patient with low platelets)  INTERVENTIONS:  - Avoid intramuscular injections, enemas and rectal medication administration  - Ensure safe mobilization of patient  - Hold pressure on venipuncture sites to achieve adequate hemostasis  - Assess for signs and symptoms of internal bleeding  - Monitor lab trends  - Patient is to report abnormal signs of bleeding to staff  - Avoid use of toothpicks and dental floss  - Use electric shaver for shaving  - Use soft bristle tooth brush  - Limit straining and forceful nose blowing  Outcome: Adequate for Discharge     Problem: PAIN - ADULT  Goal: Verbalizes/displays adequate comfort level or patient's stated pain goal  Description: INTERVENTIONS:  - Encourage pt to monitor pain and request assistance  - Assess pain using appropriate pain scale  - Administer analgesics based on type and severity of pain and evaluate response  - Implement non-pharmacological measures as appropriate and evaluate response  - Consider cultural and social influences on pain and pain management  - Manage/alleviate anxiety  - Utilize distraction and/or relaxation techniques  - Monitor for opioid side effects  - Notify MD/LIP if interventions unsuccessful or patient reports new pain  - Anticipate increased pain with activity and pre-medicate as appropriate  Outcome: Adequate for Discharge     Problem: SAFETY ADULT - FALL  Goal: Free from fall injury  Description: INTERVENTIONS:  - Assess pt frequently for physical needs  - Identify cognitive and physical deficits and behaviors that affect risk of falls.  - [place] fall precautions as indicated by assessment.  - Educate pt/family on patient safety including physical limitations  - Instruct pt to call for assistance with activity based on assessment  - Modify environment to reduce risk of injury  - Provide assistive devices as appropriate  - Consider OT/PT consult to assist with strengthening/mobility  - Encourage  toileting schedule  Outcome: Adequate for Discharge     Problem: DISCHARGE PLANNING  Goal: Discharge to home or other facility with appropriate resources  Description: INTERVENTIONS:  - Identify barriers to discharge w/pt and caregiver  - Include patient/family/discharge partner in discharge planning  - Arrange for needed discharge resources and transportation as appropriate  - Identify discharge learning needs (meds, wound care, etc)  - Arrange for interpreters to assist at discharge as needed  - Consider post-discharge preferences of patient/family/discharge partner  - Complete POLST form as appropriate  - Assess patient's ability to be responsible for managing their own health  - Refer to Case Management Department for coordinating discharge planning if the patient needs post-hospital services based on physician/LIP order or complex needs related to functional status, cognitive ability or social support system  Outcome: Adequate for Discharge

## 2024-11-19 NOTE — PAYOR COMM NOTE
--------------  ADMISSION REVIEW     Payor: JORGERUTHIERANDALL JASON  Subscriber #:  [id number]  Authorization Number: [id number]    Admit date: 11/15/24  Admit time: 12:11 PM       PLEASE REVIEW CLINICAL INFORMATION. REQUESTING RECONSIDERATION. THANK YOU.     H&P Note    History of Present Illness:   Mr. Albert is a 28 year old male presenting with an anterior mediastinal mass.      This was initially found on CXR for chest tightness. CT chest from 9/21/24 showed a 4.4x4.1x5.1cm anterior mediastinal cystic lesion with a thick rind. Patient was referred by his PCP to discuss next steps.      Patient reports intermittent flare ups of costochondritis for the past four years. The pain is located primarily on his left side and is non-severe. He has not had any pain in the past three weeks. He denies any cough, shortness of breath, fevers, chills, weight loss, dysphagia, diplopia, or weakness.     PMH unremarkable. No history of heart disease. No blood thinners. No prior thoracic surgeries. Never smoker. No family history of cancer.      /76 (BP Location: Left arm)   Pulse 60   Temp 97.8 °F (36.6 °C) (Oral)   Resp 18   Ht 180.3 cm (5' 11\")   Wt 189 lb (85.7 kg)   SpO2 100%   BMI 26.36 kg/m²      HEENT: Normocephalic, PERRL, EOMI, no scleral icterus  Neck: Supple, trachea midline, no JVD, no masses. Thyroid not grossly enlarged  Nodes: no cervical or supraclavicular lymphadenopathy appreciated  Heart: regular rate and rhythm. No murmurs, rubs or gallops. No lower extremity edema.  Lungs: Normal respiratory effort. Clear to ascultation bilaterally.   Abdomen: Soft, Non-tender, non-distended. No hepatosplenomegaly noted.  Extremities: No clubbing or cyanosis. No lateralizing weakness  Neuro: No gross cranial nerve defects, no loss of sensation  Psych:  oriented to person place and time, normal mood and affect     Assessment/Plan:      Mr. Albert is a 28 year old male presenting with an anterior mediastinal mass.      This was  initially found on CXR for chest tightness. CT chest from 9/21/24 showed a 4.4x4.1x5.1cm anterior mediastinal cystic lesion with a thick rind. Patient was referred by his PCP to discuss next steps.      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 Discussed options including continued surveillance (repeat CT chest in 3 months), MRI, surgical resection, or CT guided biopsy. Imaging findings are not totally consistent with a benign cyst. Discussed the risks and benefits of surgery. Patient expressed understanding and would like to proceed with surgery.      -Plan to proceed with robotic assisted anterior mediastinal mass resection today with Dr. Dominguez.            OPERATION DATE:  11/15/2024  OPERATIVE REPORT     PREOPERATIVE DIAGNOSIS:  Anterior mediastinal mass.  POSTOPERATIVE DIAGNOSIS:  Anterior mediastinal mass.  PROCEDURE:    1.       Flexible bronchoscopy.  2.       Left robot-assisted resection of anterior mediastinal mass.  3.       Multilevel intercostal nerve block.     SPECIMENS:  Anterior mediastinal mass for pathology and culture.     DRAINS:  24-Welsh chest tube x1.          11/16/24      Marcos Albert is having nausea. He c/o pain in his L shoulder. No SOB no dizziness. Has been up a few times yesterday walking in room.      Blood pressure 119/71, pulse 99, temperature 99.1 °F (37.3 °C), temperature source Oral, resp. rate 18, height 5' 11\" (1.803 m), weight 194 lb 3.2 oz (88.1 kg), SpO2 96%.     Physical Exam:    General: Chest tube in place   Respiratory: Clear to auscultation bilaterally. No wheezes. No rhonchi.  Cardiovascular: S1, S2. Regular rate and rhythm. No murmurs, rubs or gallops.   Abdomen: Soft, nontender, nondistended.  Positive bowel sounds. No rebound or guarding.  Neurologic: No focal neurological deficits.   Musculoskeletal: Moves all extremities.  Extremities: No edema.     Culture:        Hospital Encounter on 11/15/24   1. Tissue Aerobic Culture     Status:  None (Preliminary result)     Collection Time: 11/15/24  9:42 AM     Specimen: Mediastinum; Tissue   Result Value Ref Range     Tissue Smear No WBCs seen N/A     Tissue Smear No organisms seen N/A       Medications:    ketorolac  15 mg Intravenous Q6H    acetaminophen  1,000 mg Intravenous Q6H    heparin  5,000 Units Subcutaneous 2 times per day      Assessment & Plan:        Mediastinal cystic mass  Thoracic surgery on consult.   S/p flexible bronchoscopy robotic assisted thorascopic surgery, anterior mediastinal mass resection 11/15/24   Pain control.   Chest tube per CT surgery   Path pending. Cx's pending   Will get labs, CXR today                       CARDIOTHORACIC SURGERY  SUBJECTIVE/24H EVENTS:     Had some nausea from the antibiotic. This is better now, but he has not eaten much. Feels hungry. Drinking some water. Has not ambulated in the halls. Using IS. Has chest tube discomfort, worse with movement.         Temp (24hrs), Av.1 °F (36.7 °C), Min:97 °F (36.1 °C), Max:99.5 °F (37.5 °C)   /71 (BP Location: Right arm)   Pulse 99   Temp 99.1 °F (37.3 °C) (Oral)   Resp 18   Ht 180.3 cm (5' 11\")   Wt 194 lb 3.2 oz (88.1 kg)   SpO2 96%   BMI 27.09 kg/m²      EXAM:   General: well appearing male in no acute distress  Heart: regular rate and rhythm.   Lungs: Normal respiratory effort. Equal chest rise bilaterally  Incisions: c/d/i   Chest tube:   Air Leak: intermittent E1  Output: serosanguineous   24 hour: 187 cc  Suction: yes  Abdomen: Soft, Non-tender, non-distended.  Extremities: No clubbing or cyanosis. No lateralizing weakness  Neuro: no focal defects  Psych:  oriented to person place and time, normal mood and affect     MEDS:   ketorolac  15 mg Intravenous Q6H    acetaminophen  1,000 mg Intravenous Q6H    heparin  5,000 Units Subcutaneous 2 times per day       Assessment/Plan:      28 year old male 1 day post op from robotic assisted anterior mediastinal mass resection.      -Chest tube to  water seal.   -General diet  -Stop fluids once tolerating a diet. PRN nausea meds.   -Pain control. Add toradol.   -Maintain saturations above 90%.   -Ambulate 3-4 times per day in the halls. Spend majority of day out of bed, in chair. Encouraged cough and IS use 10x hourly.          DATE OF DISCHARGE: 11/17/24  DISCHARGE SUMMARY      Date of Admission: 11/15/2024  Date of Discharge:  11/17/2004    Discharge Diagnosis:    Mediastinal cystic mass     History of Present Illness:    The patient is a 28-year-old  male who was recently evaluated for chest tightness. Chest x-ray was abnormal, showing mediastinal abnormality. CT scan of the chest showed anterior mediastinal left-sided cystic mass, could be pericardial cyst, rule out thymoma. The mass size was 4.4 cm. The patient was referred to Thoracic Surgery and scheduled today for above-mentioned procedure by Dr. Dominguez. Postoperatively transferred to PACU for further monitoring.      Brief Synopsis:      Patient tolerated the surgery well. Chest tube remove. He is now tolerating his diet and denies any chest pain, sob, n/v/d or any other complaints.  The patient is being discharged with instructions to follow up with PCP and the Surgery team.   Discharge meds ordered by the surgery team.      Patient is to remain compliant with all discharge medications and instructions and to follow up as advised.   Patient encouraged to make healthy lifestyle and dietary changes.     Procedures during hospitalization:   Flexible bronchoscopy, anterior mediastinal mass resection 11/15/2024     Lab/Test results pending at Discharge:   Pathology and cultures     Consultants:  CT surgery      Vital signs:  Temp:  [98.5 °F (36.9 °C)-100.1 °F (37.8 °C)] 98.6 °F (37 °C)  Pulse:  [] 88  Resp:  [18-20] 18  BP: (132-157)/(80-97) 141/85  SpO2:  [93 %-98 %] 96 %     Physical Exam:    Gen: NAD AO x3  Chest: good air entry CTABL  CVS: normal s1 and s2 RR  Abd: NABS soft NT  ND  Neuro: CN 2-12 grossly intact  Ext: no edema in bilateral LE           Vitals (last day) before discharge       Date/Time Temp Pulse Resp BP SpO2 Weight O2 Device O2 Flow Rate (L/min) [facility]    11/17/24 0904 -- -- -- 141/85 -- -- -- --     11/17/24 0756 98.6 °F (37 °C) 88 18 157/97 96 % -- None (Room air) --     11/17/24 0528 99 °F (37.2 °C) 95 18 136/94 93 % -- None (Room air) --     11/17/24 0020 100.1 °F (37.8 °C) 105 20 147/97 97 % -- None (Room air) --     11/16/24 2031 99.5 °F (37.5 °C) 101 20 139/85 95 % -- None (Room air) --     11/16/24 1529 99.4 °F (37.4 °C) 95 20 139/87 98 % -- None (Room air) --     11/16/24 1140 98.9 °F (37.2 °C) 88 20 132/80 94 % -- None (Room air) --     11/16/24 0823 99.1 °F (37.3 °C) 99 18 119/71 96 % -- None (Room air) --     11/16/24 0552 99.5 °F (37.5 °C) 108 18 122/69 96 % -- None (Room air) -- MA    11/16/24 0007 98 °F (36.7 °C) 106 20 132/73 99 % -- None (Room air) -- MA          Normal vision: sees adequately in most situations; can see medication labels, newsprint

## 2024-11-21 ENCOUNTER — OFFICE VISIT (OUTPATIENT)
Dept: HEMATOLOGY/ONCOLOGY | Facility: HOSPITAL | Age: 29
End: 2024-11-21
Attending: THORACIC SURGERY (CARDIOTHORACIC VASCULAR SURGERY)
Payer: COMMERCIAL

## 2024-11-21 VITALS
RESPIRATION RATE: 16 BRPM | HEIGHT: 71 IN | WEIGHT: 188.63 LBS | BODY MASS INDEX: 26.41 KG/M2 | HEART RATE: 82 BPM | SYSTOLIC BLOOD PRESSURE: 120 MMHG | TEMPERATURE: 99 F | DIASTOLIC BLOOD PRESSURE: 77 MMHG | OXYGEN SATURATION: 99 %

## 2024-11-21 DIAGNOSIS — J98.59 MEDIASTINAL MASS: Primary | ICD-10-CM

## 2024-11-21 NOTE — PROGRESS NOTES
Thoracic Surgery Post-Op Progress Note    Mr. Albert is a 28 year old male who presents today in follow up after a robotic assisted anterior mediastinal mass resection performed on 11/15/24. He is doing well. He complains of some incisional pain controlled with ibuprofen. He denies fevers or chills. No cough. No hemoptysis. No worsening shortness of breath. No worsening pain, swelling, or drainage from wounds. He has been staying active and using incentive spirometry at home.    PMH:  No past medical history on file.    Meds:    Current Outpatient Medications:     oxyCODONE 5 MG Oral Tab, Take 1 tablet (5 mg total) by mouth every 4 (four) hours as needed for Pain., Disp: 30 tablet, Rfl: 0    ibuprofen 800 MG Oral Tab, Take 1 tablet (800 mg total) by mouth 3 (three) times daily., Disp: 270 tablet, Rfl: 3    Vital Signs:    /77 (BP Location: Left arm, Patient Position: Sitting, Cuff Size: adult)   Pulse 82   Temp 98.6 °F (37 °C) (Oral)   Resp 16   Ht 1.803 m (5' 11\")   Wt 85.5 kg (188 lb 9.6 oz)   SpO2 99%   BMI 26.30 kg/m²     Physical Exam:    General: well appearing male in no acute distress  HEENT: Normocephalic, PERRL, EOMI  Heart: regular rate and rhythm. No murmurs, rubs or gallops. No lower extremity edema.  Lungs: Normal respiratory effort. Clear to ascultation bilaterally.  Chest: incisions are clean, dry and intact. No signs of infection. Chest tube stitch removed.  Abdomen: Soft, Non-tender, non-distended. No hepatosplenomegaly noted.  Extremities: No clubbing or cyanosis. No lateralizing weakness  Neuro: No gross cranial nerve defects, no loss of sensation  Psych: oriented to person place and time, normal mood and affect    Pathology:  Final Diagnosis:      Anterior mediastinal mass;  robot-assisted resection mass:   Hyperplastic thymic tissue with thymic cyst, and ectopic/heterotopic benign pancreatic tissue.  No evidence of teratomatous elements, significant cytologic atypia, or malignancy  is identified.       Assessment:    Mr. Albert is a 28 year old male who presents today in follow up after a robotic assisted anterior mediastinal mass resection performed on 11/15/24. 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 He is doing quite well. Mr. Albert has no apparent complications from the surgery. We will continue to follow him as needed in the postoperative period. He has no restriction and we have encouraged an active lifestyle.     Plan:  Encouraged continued exercise and incentive spirometer use.  No activity restrictions  Mr. Albert should follow up with thoracic surgery on an as needed basis. He is encouraged to call with any further questions or concerns.     Ene Collins PA-C  Thoracic Surgery

## (undated) DEVICE — AIRSEAL 12 MM ACCESS PORT AND PALM GRIP OBTURATOR WITH BLADELESS OPTICAL TIP, 120 MM LENGTH: Brand: AIRSEAL

## (undated) DEVICE — SOLUTION IRRIG 1000ML ST H2O AQUALITE PLAS

## (undated) DEVICE — DRAPE,EXTREMITY,89X128,STERILE: Brand: MEDLINE

## (undated) DEVICE — VESSEL SEALER EXTEND: Brand: ENDOWRIST

## (undated) DEVICE — ENCORE® LATEX MICRO SIZE 6, STERILE LATEX POWDER-FREE SURGICAL GLOVE: Brand: ENCORE

## (undated) DEVICE — ARM DRAPE

## (undated) DEVICE — BLADELESS OBTURATOR: Brand: WECK VISTA

## (undated) DEVICE — STERILE LATEX POWDER-FREE SURGICAL GLOVESWITH NITRILE COATING: Brand: PROTEXIS

## (undated) DEVICE — CADIERE FORCEPS: Brand: ENDOWRIST

## (undated) DEVICE — CANNULA SEAL

## (undated) DEVICE — SUT COAT VCRL 2-0 27IN ABSRB VLT 36MM CT-1

## (undated) DEVICE — AIRSEAL TRI-LUMEN LILTERED TUBE SET: Brand: AIRSEAL

## (undated) DEVICE — ROBOTIC: Brand: MEDLINE INDUSTRIES, INC.

## (undated) DEVICE — SOLUTION IRRIG 1000ML 0.9% NACL USP BTL

## (undated) DEVICE — SPONGE 4X4 10PK

## (undated) DEVICE — CLIP INT M L POLYMR LOK LIG HEM O LOK

## (undated) DEVICE — SUT MCRYL 4-0 18IN PS-2 ABSRB UD 19MM 3/8 CIR

## (undated) DEVICE — 3M™ IOBAN™ 2 ANTIMICROBIAL INCISE DRAPE 6651EZ: Brand: IOBAN™ 2

## (undated) DEVICE — APPLICATOR PREP 26ML CHG 2% ISO ALC 70%

## (undated) DEVICE — BLADELESS OBTURATOR, LONG: Brand: WECK VISTA

## (undated) DEVICE — MEDI-VAC NON-CONDUCTIVE SUCTION TUBING: Brand: CARDINAL HEALTH

## (undated) DEVICE — SYRINGE MED 10ML LL TIP W/O SFTY DISP

## (undated) DEVICE — COLUMN DRAPE

## (undated) DEVICE — PENROSE DRAIN 12" X 1/4: Brand: CARDINAL HEALTH

## (undated) DEVICE — ADHESIVE SKIN TOP FOR WND CLSR DERMBND ADV

## (undated) DEVICE — SPONGE LAP L4IN KTNR STRUNG RADPQ ST

## (undated) DEVICE — ABSORBABLE HEMOSTAT (OXIDIZED REGENERATED CELLULOSE): Brand: SURGICEL

## (undated) DEVICE — Device

## (undated) DEVICE — DISPOSABLE GRASPER: Brand: EPIX LAPAROSCOPIC GRASPER

## (undated) DEVICE — CONTAINER,SPECIMEN,OR STERILE,4OZ: Brand: MEDLINE

## (undated) DEVICE — SUT PERMA- 0 30IN FSL NABSRB BLK 30MM 3/8

## (undated) DEVICE — ANCHOR TISSUE RETRIEVAL SYSTEM, BAG SIZE 175 ML, PORT SIZE 10 MM: Brand: ANCHOR TISSUE RETRIEVAL SYSTEM

## (undated) NOTE — Clinical Note
Mr. Albert is doing well.  Please see attached note for an update on his operation and pathology. Please feel free to call me with any questions at [phone].  Thank you,  Diallo Dominguez Thoracic Surgery

## (undated) NOTE — Clinical Note
I saw your patient, Mr. Albert.  Please see attached note for my assessment and plans moving forward.  Thank you for involving me in his care.  Please feel free to call me with any questions at [phone]  Diallo Dominguez Thoracic Surgery

## (undated) NOTE — LETTER
To Whom It May Concern:  This certifies that Marcos Albert is under our medical care. Please excuse him from work starting on 11/15/24. He is okay to return to work without restrictions on 12/02/24.       Sincerely,    Ene Collins PA-C  [address] [address]  [phone]        Document generated by:  Ene Collins PA-C